# Patient Record
Sex: FEMALE | Race: WHITE | NOT HISPANIC OR LATINO | Employment: FULL TIME | URBAN - METROPOLITAN AREA
[De-identification: names, ages, dates, MRNs, and addresses within clinical notes are randomized per-mention and may not be internally consistent; named-entity substitution may affect disease eponyms.]

---

## 2023-05-30 ENCOUNTER — OFFICE VISIT (OUTPATIENT)
Dept: PRIMARY CARE | Facility: CLINIC | Age: 38
End: 2023-05-30
Payer: COMMERCIAL

## 2023-05-30 VITALS
TEMPERATURE: 97.8 F | RESPIRATION RATE: 16 BRPM | DIASTOLIC BLOOD PRESSURE: 74 MMHG | SYSTOLIC BLOOD PRESSURE: 122 MMHG | HEIGHT: 64 IN | HEART RATE: 72 BPM | WEIGHT: 175 LBS | BODY MASS INDEX: 29.88 KG/M2

## 2023-05-30 DIAGNOSIS — H81.12 BENIGN PAROXYSMAL POSITIONAL VERTIGO OF LEFT EAR: Primary | ICD-10-CM

## 2023-05-30 PROCEDURE — 1036F TOBACCO NON-USER: CPT | Performed by: FAMILY MEDICINE

## 2023-05-30 PROCEDURE — 99213 OFFICE O/P EST LOW 20 MIN: CPT | Performed by: FAMILY MEDICINE

## 2023-05-30 RX ORDER — MECLIZINE HYDROCHLORIDE 25 MG/1
25 TABLET ORAL 3 TIMES DAILY PRN
Qty: 30 TABLET | Refills: 1 | Status: SHIPPED | OUTPATIENT
Start: 2023-05-30

## 2023-05-30 NOTE — PROGRESS NOTES
"Subjective   Patient ID: See Aldana is a 38 y.o. female who presents for Dizziness (Patient has had Vertigo since Sunday ).    Room was spinning and felt nausea   2 meclizine  helped  but today with head movement is making it worse     Had an episodes 6 months ago which went away on its          Review of Systems    Objective   /74   Pulse 72   Temp 36.6 °C (97.8 °F)   Resp 16   Ht 1.626 m (5' 4\")   Wt 79.4 kg (175 lb)   BMI 30.04 kg/m²     Physical Exam  Vitals and nursing note reviewed.   Constitutional:       General: She is not in acute distress.     Appearance: She is not ill-appearing.   HENT:      Head: Normocephalic and atraumatic.      Mouth/Throat:      Mouth: Mucous membranes are moist.   Eyes:      Conjunctiva/sclera: Conjunctivae normal.   Cardiovascular:      Rate and Rhythm: Normal rate and regular rhythm.      Heart sounds: Normal heart sounds.   Pulmonary:      Effort: Pulmonary effort is normal.      Breath sounds: Normal breath sounds.   Skin:     General: Skin is warm.   Neurological:      Mental Status: She is alert.   Psychiatric:         Mood and Affect: Mood normal.         Thought Content: Thought content normal.         Judgment: Judgment normal.         Assessment/Plan   Problem List Items Addressed This Visit    None  Visit Diagnoses       Benign paroxysmal positional vertigo of left ear    -  Primary    Relevant Medications    meclizine (Antivert) 25 mg tablet               "

## 2023-07-12 ENCOUNTER — TELEMEDICINE (OUTPATIENT)
Dept: PRIMARY CARE | Facility: CLINIC | Age: 38
End: 2023-07-12
Payer: COMMERCIAL

## 2023-07-12 DIAGNOSIS — E66.09 CLASS 1 OBESITY DUE TO EXCESS CALORIES WITHOUT SERIOUS COMORBIDITY WITH BODY MASS INDEX (BMI) OF 30.0 TO 30.9 IN ADULT: Primary | ICD-10-CM

## 2023-07-12 PROCEDURE — 99212 OFFICE O/P EST SF 10 MIN: CPT | Performed by: FAMILY MEDICINE

## 2023-07-12 NOTE — PROGRESS NOTES
Subjective   Patient ID: See Aldana is a 38 y.o. female who presents for Obesity.    Last year lost 15 pounds with calorie reduction.  In May returned 2000 calorie a day diet, now back to 1200 calories.   Had fasted 2 weeks of the month for Ramadan   doing 45 minute cardio and doing weights  5 days a week for an hour   No endocrine cancers   will check with her insurance on coverage   Discussed diet and exercise   The visit was done using video and audio. Consent was given for the visit. Patient identity was confirmed. Time spent with patient was 13 minutes which included obtaining history, counselling and coordination of care, ordering and reviewing tests and documentation in medical records.  The patient was located at home          Review of Systems    Objective   There were no vitals taken for this visit.    Physical Exam    Assessment/Plan   Problem List Items Addressed This Visit    None  Visit Diagnoses       Class 1 obesity due to excess calories without serious comorbidity with body mass index (BMI) of 30.0 to 30.9 in adult    -  Primary    Relevant Orders    Referral to Nutrition Services

## 2023-11-30 ENCOUNTER — OFFICE VISIT (OUTPATIENT)
Dept: PRIMARY CARE | Facility: CLINIC | Age: 38
End: 2023-11-30
Payer: COMMERCIAL

## 2023-11-30 VITALS
TEMPERATURE: 96.4 F | WEIGHT: 183 LBS | HEIGHT: 64 IN | HEART RATE: 72 BPM | SYSTOLIC BLOOD PRESSURE: 120 MMHG | DIASTOLIC BLOOD PRESSURE: 78 MMHG | RESPIRATION RATE: 12 BRPM | BODY MASS INDEX: 31.24 KG/M2

## 2023-11-30 DIAGNOSIS — F41.9 ANXIETY: Primary | ICD-10-CM

## 2023-11-30 DIAGNOSIS — R41.840 CONCENTRATION DEFICIT: ICD-10-CM

## 2023-11-30 PROCEDURE — 1036F TOBACCO NON-USER: CPT | Performed by: FAMILY MEDICINE

## 2023-11-30 PROCEDURE — 3008F BODY MASS INDEX DOCD: CPT | Performed by: FAMILY MEDICINE

## 2023-11-30 PROCEDURE — 99213 OFFICE O/P EST LOW 20 MIN: CPT | Performed by: FAMILY MEDICINE

## 2023-11-30 RX ORDER — ESCITALOPRAM OXALATE 5 MG/1
5 TABLET ORAL DAILY
Qty: 30 TABLET | Refills: 1 | Status: SHIPPED | OUTPATIENT
Start: 2023-11-30 | End: 2024-02-28 | Stop reason: ALTCHOICE

## 2023-11-30 ASSESSMENT — ANXIETY QUESTIONNAIRES
2. NOT BEING ABLE TO STOP OR CONTROL WORRYING: NEARLY EVERY DAY
1. FEELING NERVOUS, ANXIOUS, OR ON EDGE: NEARLY EVERY DAY
IF YOU CHECKED OFF ANY PROBLEMS ON THIS QUESTIONNAIRE, HOW DIFFICULT HAVE THESE PROBLEMS MADE IT FOR YOU TO DO YOUR WORK, TAKE CARE OF THINGS AT HOME, OR GET ALONG WITH OTHER PEOPLE: VERY DIFFICULT
7. FEELING AFRAID AS IF SOMETHING AWFUL MIGHT HAPPEN: NEARLY EVERY DAY
5. BEING SO RESTLESS THAT IT IS HARD TO SIT STILL: NEARLY EVERY DAY
GAD7 TOTAL SCORE: 21
6. BECOMING EASILY ANNOYED OR IRRITABLE: NEARLY EVERY DAY
4. TROUBLE RELAXING: NEARLY EVERY DAY
3. WORRYING TOO MUCH ABOUT DIFFERENT THINGS: NEARLY EVERY DAY

## 2023-11-30 ASSESSMENT — PATIENT HEALTH QUESTIONNAIRE - PHQ9
1. LITTLE INTEREST OR PLEASURE IN DOING THINGS: NOT AT ALL
2. FEELING DOWN, DEPRESSED OR HOPELESS: NOT AT ALL
SUM OF ALL RESPONSES TO PHQ9 QUESTIONS 1 & 2: 0

## 2023-11-30 NOTE — PROGRESS NOTES
"Subjective   Patient ID: See Aldana is a 38 y.o. female who presents for Anxiety (Also has trouble focusing).    Working from home past year.  Feels has no control,  hard getting housework done,   ?getting depressed   Takes lots of energy to sit,  procrastinates   Gets distracted easily      Does better with time management if has deadlines but having problem doing it   Had panic attack the other day     Discussed ADHD vs anxiety vs both,  wants to wait on ADHD tests and try medication for anxiety and use time management better which has work in the past          Review of Systems    Objective   /78   Pulse 72   Temp 35.8 °C (96.4 °F) (Temporal)   Resp 12   Ht 1.626 m (5' 4\")   Wt 83 kg (183 lb)   BMI 31.41 kg/m²     Physical Exam  Vitals and nursing note reviewed.   Constitutional:       General: She is not in acute distress.     Appearance: She is not ill-appearing.   HENT:      Head: Normocephalic and atraumatic.      Mouth/Throat:      Mouth: Mucous membranes are moist.   Eyes:      Conjunctiva/sclera: Conjunctivae normal.   Cardiovascular:      Rate and Rhythm: Normal rate and regular rhythm.      Heart sounds: Normal heart sounds.   Pulmonary:      Effort: Pulmonary effort is normal.      Breath sounds: Normal breath sounds.   Skin:     General: Skin is warm.   Neurological:      Mental Status: She is alert.   Psychiatric:         Mood and Affect: Mood normal.         Thought Content: Thought content normal.         Judgment: Judgment normal.         Assessment/Plan   Problem List Items Addressed This Visit    None  Visit Diagnoses         Codes    Anxiety    -  Primary F41.9    Relevant Medications    escitalopram (Lexapro) 5 mg tablet    Concentration deficit     R41.840               "

## 2024-02-07 ENCOUNTER — OFFICE VISIT (OUTPATIENT)
Dept: PRIMARY CARE | Facility: CLINIC | Age: 39
End: 2024-02-07
Payer: COMMERCIAL

## 2024-02-07 VITALS
TEMPERATURE: 96 F | DIASTOLIC BLOOD PRESSURE: 80 MMHG | HEART RATE: 80 BPM | BODY MASS INDEX: 31.24 KG/M2 | WEIGHT: 183 LBS | HEIGHT: 64 IN | RESPIRATION RATE: 16 BRPM | SYSTOLIC BLOOD PRESSURE: 130 MMHG

## 2024-02-07 DIAGNOSIS — M54.50 ACUTE BILATERAL LOW BACK PAIN WITHOUT SCIATICA: Primary | ICD-10-CM

## 2024-02-07 DIAGNOSIS — K52.9 GASTROENTERITIS: ICD-10-CM

## 2024-02-07 LAB
POC APPEARANCE, URINE: CLEAR
POC BILIRUBIN, URINE: NEGATIVE
POC BLOOD, URINE: ABNORMAL
POC COLOR, URINE: YELLOW
POC GLUCOSE, URINE: NEGATIVE MG/DL
POC KETONES, URINE: NEGATIVE MG/DL
POC LEUKOCYTES, URINE: NEGATIVE
POC NITRITE,URINE: NEGATIVE
POC PH, URINE: 5 PH
POC PROTEIN, URINE: ABNORMAL MG/DL
POC SPECIFIC GRAVITY, URINE: 1.02
POC UROBILINOGEN, URINE: 0.2 EU/DL

## 2024-02-07 PROCEDURE — 81002 URINALYSIS NONAUTO W/O SCOPE: CPT | Performed by: FAMILY MEDICINE

## 2024-02-07 PROCEDURE — 3008F BODY MASS INDEX DOCD: CPT | Performed by: FAMILY MEDICINE

## 2024-02-07 PROCEDURE — 99213 OFFICE O/P EST LOW 20 MIN: CPT | Performed by: FAMILY MEDICINE

## 2024-02-07 PROCEDURE — 1036F TOBACCO NON-USER: CPT | Performed by: FAMILY MEDICINE

## 2024-02-07 RX ORDER — ONDANSETRON 4 MG/1
4 TABLET, FILM COATED ORAL EVERY 8 HOURS PRN
Qty: 30 TABLET | Refills: 0 | Status: SHIPPED | OUTPATIENT
Start: 2024-02-07

## 2024-02-07 RX ORDER — OMEPRAZOLE 40 MG/1
40 CAPSULE, DELAYED RELEASE ORAL
Qty: 30 CAPSULE | Refills: 1 | Status: SHIPPED | OUTPATIENT
Start: 2024-02-07 | End: 2024-03-03

## 2024-02-07 ASSESSMENT — ENCOUNTER SYMPTOMS
HEADACHES: 0
WEAKNESS: 0
FEVER: 0
BACK PAIN: 1
DYSURIA: 0
ABDOMINAL PAIN: 0
LEG PAIN: 0
BOWEL INCONTINENCE: 0

## 2024-02-07 ASSESSMENT — PATIENT HEALTH QUESTIONNAIRE - PHQ9
SUM OF ALL RESPONSES TO PHQ9 QUESTIONS 1 & 2: 0
2. FEELING DOWN, DEPRESSED OR HOPELESS: NOT AT ALL
1. LITTLE INTEREST OR PLEASURE IN DOING THINGS: NOT AT ALL

## 2024-02-07 NOTE — PROGRESS NOTES
"Subjective   Patient ID: See Aldana is a 38 y.o. female who presents for Nausea (With lower  back pain  Urgent care  flu test neg  covid test neg x2).    Sickness going through family  son vomited/body aches 2 days   daughter vomited/body aches also few day  another son also got it and all are improving   when stands toes feels numb  no back pain bend    LMP 2 weeks ago normal   vomited few times when first started  Started with headache last Tuesday until yesterday   low back pain started Friday    pain upper back better   Went to UC Saturday and negative for flu    Back hurts all the time but is improving   Taking aleve once a day  was taking ibuprofen all day    Nausea all the time        Back Pain  This is a new problem. The current episode started in the past 7 days. The pain does not radiate. Associated symptoms include pelvic pain. Pertinent negatives include no abdominal pain, bowel incontinence, dysuria, fever, headaches, leg pain or weakness.        Review of Systems   Constitutional:  Negative for fever.   Gastrointestinal:  Negative for abdominal pain and bowel incontinence.   Genitourinary:  Positive for pelvic pain. Negative for dysuria.   Musculoskeletal:  Positive for back pain.   Neurological:  Negative for weakness and headaches.       Objective   /80   Pulse 80   Temp 35.6 °C (96 °F) (Temporal)   Resp 16   Ht 1.626 m (5' 4\")   Wt 83 kg (183 lb)   BMI 31.41 kg/m²     Physical Exam  Vitals and nursing note reviewed.   Constitutional:       General: She is not in acute distress.     Appearance: She is not ill-appearing.   HENT:      Head: Normocephalic and atraumatic.      Mouth/Throat:      Mouth: Mucous membranes are moist.      Pharynx: Oropharynx is clear.   Eyes:      Conjunctiva/sclera: Conjunctivae normal.   Cardiovascular:      Rate and Rhythm: Normal rate and regular rhythm.      Heart sounds: Normal heart sounds.   Pulmonary:      Effort: Pulmonary effort is normal.      " Breath sounds: Normal breath sounds.   Skin:     General: Skin is warm.   Neurological:      Mental Status: She is alert.   Psychiatric:         Mood and Affect: Mood normal.         Thought Content: Thought content normal.         Judgment: Judgment normal.         Assessment/Plan   Problem List Items Addressed This Visit    None  Visit Diagnoses         Codes    Acute bilateral low back pain without sciatica    -  Primary M54.50    Relevant Orders    POCT UA (nonautomated) manually resulted    Gastroenteritis     K52.9    Relevant Medications    omeprazole (PriLOSEC) 40 mg DR capsule    ondansetron (Zofran) 4 mg tablet

## 2024-02-12 ENCOUNTER — PHARMACY VISIT (OUTPATIENT)
Dept: PHARMACY | Facility: CLINIC | Age: 39
End: 2024-02-12

## 2024-02-12 PROCEDURE — RXOTC WILLOW AMBULATORY OTC CHARGE

## 2024-02-28 ENCOUNTER — OFFICE VISIT (OUTPATIENT)
Dept: PRIMARY CARE | Facility: CLINIC | Age: 39
End: 2024-02-28
Payer: COMMERCIAL

## 2024-02-28 ENCOUNTER — HOSPITAL ENCOUNTER (OUTPATIENT)
Dept: RADIOLOGY | Facility: CLINIC | Age: 39
Discharge: HOME | End: 2024-02-28
Payer: COMMERCIAL

## 2024-02-28 ENCOUNTER — PATIENT MESSAGE (OUTPATIENT)
Dept: PRIMARY CARE | Facility: CLINIC | Age: 39
End: 2024-02-28

## 2024-02-28 ENCOUNTER — LAB (OUTPATIENT)
Dept: LAB | Facility: LAB | Age: 39
End: 2024-02-28
Payer: COMMERCIAL

## 2024-02-28 VITALS
SYSTOLIC BLOOD PRESSURE: 102 MMHG | TEMPERATURE: 97.3 F | HEIGHT: 64 IN | RESPIRATION RATE: 20 BRPM | DIASTOLIC BLOOD PRESSURE: 76 MMHG | BODY MASS INDEX: 30.73 KG/M2 | WEIGHT: 180 LBS | HEART RATE: 80 BPM

## 2024-02-28 DIAGNOSIS — R79.89 HIGH SERUM THYROXINE (T4): ICD-10-CM

## 2024-02-28 DIAGNOSIS — G43.909 MIGRAINE WITHOUT STATUS MIGRAINOSUS, NOT INTRACTABLE, UNSPECIFIED MIGRAINE TYPE: ICD-10-CM

## 2024-02-28 DIAGNOSIS — E55.9 VITAMIN D DEFICIENCY: ICD-10-CM

## 2024-02-28 DIAGNOSIS — R79.89 LOW TSH LEVEL: ICD-10-CM

## 2024-02-28 DIAGNOSIS — R53.83 OTHER FATIGUE: ICD-10-CM

## 2024-02-28 DIAGNOSIS — Z82.61: ICD-10-CM

## 2024-02-28 DIAGNOSIS — R06.09 DYSPNEA ON EXERTION: ICD-10-CM

## 2024-02-28 DIAGNOSIS — E66.9 OBESITY (BMI 30.0-34.9): ICD-10-CM

## 2024-02-28 DIAGNOSIS — Z00.00 HEALTHCARE MAINTENANCE: ICD-10-CM

## 2024-02-28 DIAGNOSIS — Z13.31 SCREENING FOR DEPRESSION: ICD-10-CM

## 2024-02-28 DIAGNOSIS — J45.20 MILD INTERMITTENT ASTHMA WITHOUT COMPLICATION (HHS-HCC): ICD-10-CM

## 2024-02-28 DIAGNOSIS — R00.2 PALPITATIONS: Primary | ICD-10-CM

## 2024-02-28 DIAGNOSIS — R74.01 ELEVATED ALT MEASUREMENT: ICD-10-CM

## 2024-02-28 PROBLEM — R51.9 HEADACHE: Status: ACTIVE | Noted: 2024-02-28

## 2024-02-28 PROBLEM — Z20.822 SUSPECTED COVID-19 VIRUS INFECTION: Status: RESOLVED | Noted: 2024-02-28 | Resolved: 2024-02-28

## 2024-02-28 PROBLEM — R41.840 ATTENTION DISTURBANCE: Status: RESOLVED | Noted: 2024-02-28 | Resolved: 2024-02-28

## 2024-02-28 PROBLEM — A04.8 H. PYLORI INFECTION: Status: RESOLVED | Noted: 2024-02-28 | Resolved: 2024-02-28

## 2024-02-28 PROBLEM — Z20.822 SUSPECTED SEVERE ACUTE RESPIRATORY SYNDROME CORONAVIRUS 2 (SARS-COV-2) INFECTION: Status: RESOLVED | Noted: 2024-02-28 | Resolved: 2024-02-28

## 2024-02-28 PROBLEM — B85.0 PEDICULOSIS CAPITIS: Status: RESOLVED | Noted: 2024-02-28 | Resolved: 2024-02-28

## 2024-02-28 PROBLEM — H61.23 BILATERAL IMPACTED CERUMEN: Status: RESOLVED | Noted: 2024-02-28 | Resolved: 2024-02-28

## 2024-02-28 PROBLEM — M25.552 ACUTE PAIN OF LEFT HIP: Status: RESOLVED | Noted: 2024-02-28 | Resolved: 2024-02-28

## 2024-02-28 PROBLEM — B85.0 HEAD LICE: Status: RESOLVED | Noted: 2024-02-28 | Resolved: 2024-02-28

## 2024-02-28 PROBLEM — R71.8 ELEVATED RED BLOOD CELL COUNT: Status: RESOLVED | Noted: 2024-02-28 | Resolved: 2024-02-28

## 2024-02-28 PROBLEM — R07.9 CHEST PAIN: Status: RESOLVED | Noted: 2024-02-28 | Resolved: 2024-02-28

## 2024-02-28 PROBLEM — H60.90 OTITIS EXTERNA: Status: RESOLVED | Noted: 2024-02-28 | Resolved: 2024-02-28

## 2024-02-28 PROBLEM — H92.09 OTALGIA: Status: RESOLVED | Noted: 2024-02-28 | Resolved: 2024-02-28

## 2024-02-28 PROBLEM — J02.9 SORE THROAT: Status: RESOLVED | Noted: 2024-02-28 | Resolved: 2024-02-28

## 2024-02-28 PROBLEM — K52.9 INFLAMMATION OF STOMACH AND INTESTINE: Status: RESOLVED | Noted: 2024-02-28 | Resolved: 2024-02-28

## 2024-02-28 PROBLEM — M25.559 ARTHRALGIA OF HIP: Status: RESOLVED | Noted: 2024-02-28 | Resolved: 2024-02-28

## 2024-02-28 PROBLEM — S43.409A SPRAIN OF SHOULDER: Status: RESOLVED | Noted: 2017-11-13 | Resolved: 2024-02-28

## 2024-02-28 PROBLEM — H61.20 IMPACTED CERUMEN: Status: RESOLVED | Noted: 2023-01-29 | Resolved: 2024-02-28

## 2024-02-28 PROBLEM — J45.909 ASTHMA (HHS-HCC): Status: ACTIVE | Noted: 2024-02-28

## 2024-02-28 PROBLEM — E66.811 OBESITY (BMI 30.0-34.9): Status: ACTIVE | Noted: 2024-02-28

## 2024-02-28 PROBLEM — H61.22 IMPACTED CERUMEN OF LEFT EAR: Status: RESOLVED | Noted: 2024-02-28 | Resolved: 2024-02-28

## 2024-02-28 PROBLEM — J02.9 PHARYNGITIS: Status: RESOLVED | Noted: 2024-02-28 | Resolved: 2024-02-28

## 2024-02-28 PROBLEM — H10.30 ACUTE CONJUNCTIVITIS: Status: RESOLVED | Noted: 2024-02-28 | Resolved: 2024-02-28

## 2024-02-28 PROBLEM — K21.9 GERD (GASTROESOPHAGEAL REFLUX DISEASE): Status: ACTIVE | Noted: 2024-02-28

## 2024-02-28 PROBLEM — F41.9 ANXIETY: Status: ACTIVE | Noted: 2024-02-28

## 2024-02-28 PROBLEM — H81.10 BENIGN PAROXYSMAL POSITIONAL VERTIGO: Status: ACTIVE | Noted: 2024-02-28

## 2024-02-28 PROBLEM — F41.0 PANIC ATTACK: Status: ACTIVE | Noted: 2024-02-28

## 2024-02-28 PROBLEM — L70.0 ACNE CYSTICA: Status: ACTIVE | Noted: 2024-02-28

## 2024-02-28 PROBLEM — M54.16 LUMBAR RADICULOPATHY: Status: ACTIVE | Noted: 2024-02-28

## 2024-02-28 PROBLEM — J06.9 URI (UPPER RESPIRATORY INFECTION): Status: RESOLVED | Noted: 2024-02-28 | Resolved: 2024-02-28

## 2024-02-28 PROBLEM — H92.09 PAIN OF EAR STRUCTURE: Status: RESOLVED | Noted: 2024-02-28 | Resolved: 2024-02-28

## 2024-02-28 PROBLEM — N92.0 MENORRHAGIA: Status: RESOLVED | Noted: 2024-02-28 | Resolved: 2024-02-28

## 2024-02-28 PROBLEM — R50.9 FEVER: Status: RESOLVED | Noted: 2024-02-28 | Resolved: 2024-02-28

## 2024-02-28 PROBLEM — J30.9 ALLERGIC RHINITIS: Status: RESOLVED | Noted: 2024-02-28 | Resolved: 2024-02-28

## 2024-02-28 LAB
25(OH)D3 SERPL-MCNC: <7 NG/ML (ref 30–100)
ALBUMIN SERPL BCP-MCNC: 4.1 G/DL (ref 3.4–5)
ALP SERPL-CCNC: 44 U/L (ref 33–110)
ALT SERPL W P-5'-P-CCNC: 63 U/L (ref 7–45)
ANION GAP SERPL CALC-SCNC: 11 MMOL/L (ref 10–20)
AST SERPL W P-5'-P-CCNC: 34 U/L (ref 9–39)
BILIRUB SERPL-MCNC: 0.6 MG/DL (ref 0–1.2)
BUN SERPL-MCNC: 13 MG/DL (ref 6–23)
CALCIUM SERPL-MCNC: 9.3 MG/DL (ref 8.6–10.3)
CHLORIDE SERPL-SCNC: 103 MMOL/L (ref 98–107)
CHOLEST SERPL-MCNC: 184 MG/DL (ref 0–199)
CHOLESTEROL/HDL RATIO: 3.3
CO2 SERPL-SCNC: 28 MMOL/L (ref 21–32)
CREAT SERPL-MCNC: 0.68 MG/DL (ref 0.5–1.05)
EGFRCR SERPLBLD CKD-EPI 2021: >90 ML/MIN/1.73M*2
ERYTHROCYTE [DISTWIDTH] IN BLOOD BY AUTOMATED COUNT: 13 % (ref 11.5–14.5)
ERYTHROCYTE [SEDIMENTATION RATE] IN BLOOD BY WESTERGREN METHOD: 15 MM/H (ref 0–20)
GLUCOSE SERPL-MCNC: 93 MG/DL (ref 74–99)
HCT VFR BLD AUTO: 37 % (ref 36–46)
HDLC SERPL-MCNC: 56.6 MG/DL
HGB BLD-MCNC: 11.9 G/DL (ref 12–16)
IRON SATN MFR SERPL: 24 % (ref 25–45)
IRON SERPL-MCNC: 86 UG/DL (ref 35–150)
LDLC SERPL CALC-MCNC: 111 MG/DL
MCH RBC QN AUTO: 26.1 PG (ref 26–34)
MCHC RBC AUTO-ENTMCNC: 32.2 G/DL (ref 32–36)
MCV RBC AUTO: 81 FL (ref 80–100)
NON HDL CHOLESTEROL: 127 MG/DL (ref 0–149)
NRBC BLD-RTO: 0 /100 WBCS (ref 0–0)
PLATELET # BLD AUTO: 340 X10*3/UL (ref 150–450)
POTASSIUM SERPL-SCNC: 4.2 MMOL/L (ref 3.5–5.3)
PROT SERPL-MCNC: 6.6 G/DL (ref 6.4–8.2)
RBC # BLD AUTO: 4.56 X10*6/UL (ref 4–5.2)
RHEUMATOID FACT SER NEPH-ACNC: <10 IU/ML (ref 0–15)
SODIUM SERPL-SCNC: 138 MMOL/L (ref 136–145)
T4 FREE SERPL-MCNC: 1.46 NG/DL (ref 0.61–1.12)
TIBC SERPL-MCNC: 356 UG/DL (ref 240–445)
TRIGL SERPL-MCNC: 82 MG/DL (ref 0–149)
TSH SERPL-ACNC: <0.01 MIU/L (ref 0.44–3.98)
UIBC SERPL-MCNC: 270 UG/DL (ref 110–370)
VIT B12 SERPL-MCNC: 474 PG/ML (ref 211–911)
VLDL: 16 MG/DL (ref 0–40)
WBC # BLD AUTO: 6.9 X10*3/UL (ref 4.4–11.3)

## 2024-02-28 PROCEDURE — 96127 BRIEF EMOTIONAL/BEHAV ASSMT: CPT | Performed by: FAMILY MEDICINE

## 2024-02-28 PROCEDURE — 86225 DNA ANTIBODY NATIVE: CPT

## 2024-02-28 PROCEDURE — 83550 IRON BINDING TEST: CPT

## 2024-02-28 PROCEDURE — 83540 ASSAY OF IRON: CPT

## 2024-02-28 PROCEDURE — 71046 X-RAY EXAM CHEST 2 VIEWS: CPT

## 2024-02-28 PROCEDURE — 80053 COMPREHEN METABOLIC PANEL: CPT

## 2024-02-28 PROCEDURE — 85652 RBC SED RATE AUTOMATED: CPT

## 2024-02-28 PROCEDURE — 82306 VITAMIN D 25 HYDROXY: CPT

## 2024-02-28 PROCEDURE — 86235 NUCLEAR ANTIGEN ANTIBODY: CPT

## 2024-02-28 PROCEDURE — 84439 ASSAY OF FREE THYROXINE: CPT

## 2024-02-28 PROCEDURE — 83735 ASSAY OF MAGNESIUM: CPT

## 2024-02-28 PROCEDURE — 84443 ASSAY THYROID STIM HORMONE: CPT

## 2024-02-28 PROCEDURE — 86431 RHEUMATOID FACTOR QUANT: CPT

## 2024-02-28 PROCEDURE — 36415 COLL VENOUS BLD VENIPUNCTURE: CPT

## 2024-02-28 PROCEDURE — 99214 OFFICE O/P EST MOD 30 MIN: CPT | Performed by: FAMILY MEDICINE

## 2024-02-28 PROCEDURE — 71046 X-RAY EXAM CHEST 2 VIEWS: CPT | Performed by: RADIOLOGY

## 2024-02-28 PROCEDURE — 93000 ELECTROCARDIOGRAM COMPLETE: CPT | Performed by: FAMILY MEDICINE

## 2024-02-28 PROCEDURE — 82607 VITAMIN B-12: CPT

## 2024-02-28 PROCEDURE — 85027 COMPLETE CBC AUTOMATED: CPT

## 2024-02-28 PROCEDURE — 80061 LIPID PANEL: CPT

## 2024-02-28 PROCEDURE — 86038 ANTINUCLEAR ANTIBODIES: CPT

## 2024-02-28 RX ORDER — ALBUTEROL SULFATE 90 UG/1
2 AEROSOL, METERED RESPIRATORY (INHALATION) EVERY 4 HOURS PRN
Qty: 8.5 G | Refills: 0 | Status: SHIPPED | OUTPATIENT
Start: 2024-02-28 | End: 2024-05-30

## 2024-02-28 ASSESSMENT — ENCOUNTER SYMPTOMS
BLOOD IN STOOL: 0
FATIGUE: 1
HEMATURIA: 0
DYSPHORIC MOOD: 0
VOMITING: 0
NAUSEA: 0
FEVER: 0
RHINORRHEA: 0
SEIZURES: 0
SORE THROAT: 0
SLEEP DISTURBANCE: 0
BRUISES/BLEEDS EASILY: 0
NERVOUS/ANXIOUS: 0
WHEEZING: 0
COUGH: 0
NUMBNESS: 0
HEADACHES: 1
WEAKNESS: 0
SHORTNESS OF BREATH: 1
PALPITATIONS: 1
CONSTIPATION: 0
DIFFICULTY URINATING: 0
DIARRHEA: 0

## 2024-02-28 ASSESSMENT — PATIENT HEALTH QUESTIONNAIRE - PHQ9
SUM OF ALL RESPONSES TO PHQ9 QUESTIONS 1 AND 2: 0
1. LITTLE INTEREST OR PLEASURE IN DOING THINGS: NOT AT ALL
2. FEELING DOWN, DEPRESSED OR HOPELESS: NOT AT ALL

## 2024-02-28 NOTE — PROGRESS NOTES
"Subjective   Patient ID: Italo Aldana is a 38 y.o. female who presents for Fatigue (Pt was ill last month (tested negative for covid, no resp sx just fever, chills, body aches, headaches) Ever since, pt has been having a hard time catching breath when moving around. She also complains of hard heartbeats randomly, even while sleeping. ).    HPI     Review of Systems   Constitutional:  Positive for fatigue. Negative for fever.        Was ill last month x 2 wk, pt was neg for covid flu  Has palp and fatigue  Has sob, has asthma    HENT:  Negative for congestion, ear pain, rhinorrhea and sore throat.    Respiratory:  Positive for shortness of breath. Negative for cough and wheezing.         Feels asthma is not stable currently  Pt using her sons  alb inhaler   Cardiovascular:  Positive for palpitations. Negative for chest pain.        Dad with cad,   at 63 yo  Brother with early cad,  AAA rupture 41 yo, mom with blockages and is getting stents 71 yo  Pt has never seen card in the past   Gastrointestinal:  Negative for blood in stool, constipation, diarrhea, nausea and vomiting.   Genitourinary:  Negative for difficulty urinating, hematuria, vaginal bleeding and vaginal discharge.   Neurological:  Positive for headaches. Negative for seizures, syncope, weakness and numbness.        Has migraines   Hematological:  Does not bruise/bleed easily.   Psychiatric/Behavioral:  Negative for dysphoric mood, sleep disturbance and suicidal ideas. The patient is not nervous/anxious.        Objective   /76   Pulse 80   Temp 36.3 °C (97.3 °F)   Resp 20   Ht 1.626 m (5' 4\")   Wt 81.6 kg (180 lb)   BMI 30.90 kg/m²     Physical Exam  Vitals and nursing note reviewed.   Constitutional:       General: She is not in acute distress.     Appearance: Normal appearance.   HENT:      Head: Normocephalic and atraumatic.   Cardiovascular:      Rate and Rhythm: Normal rate and regular rhythm.      Heart sounds: Normal heart " sounds.   Pulmonary:      Effort: Pulmonary effort is normal. No respiratory distress.      Breath sounds: Normal breath sounds. No wheezing.   Abdominal:      General: Abdomen is flat. Bowel sounds are normal.      Palpations: Abdomen is soft.   Musculoskeletal:      Cervical back: Neck supple.   Lymphadenopathy:      Cervical: No cervical adenopathy.   Skin:     General: Skin is warm and dry.   Neurological:      General: No focal deficit present.      Mental Status: She is alert.   Psychiatric:         Mood and Affect: Mood normal.         Behavior: Behavior normal.         Assessment/Plan   Problem List Items Addressed This Visit             ICD-10-CM    Asthma J45.909    Relevant Medications    albuterol (ProAir HFA) 90 mcg/actuation inhaler    Other fatigue R53.83    Relevant Orders    Referral to Cardiology    CBC    Comprehensive Metabolic Panel    Lipid Panel    TSH with reflex to Free T4 if abnormal    Vitamin D 25-Hydroxy,Total    Vitamin B12    Iron and TIBC    Screening for depression Z13.31    Obesity (BMI 30.0-34.9) E66.9     Advis healthy diet and exercise  Ho printed         Palpitations - Primary R00.2     Pt with palp, fatigue and dyspnea on exertion and strong FH of early CAD/death  Will check EKG and refer pt to card for card eval         Relevant Orders    ECG 12 Lead    Referral to Cardiology    Dyspnea on exertion R06.09    Relevant Orders    Referral to Cardiology    XR chest 2 views     Other Visit Diagnoses         Codes    Healthcare maintenance     Z00.00    Relevant Orders    Follow Up In [facility] - PCP - Health Maintenance

## 2024-02-28 NOTE — ASSESSMENT & PLAN NOTE
Pt with palp, fatigue and dyspnea on exertion and strong FH of early CAD/death  Will check EKG and refer pt to card for card eval

## 2024-02-28 NOTE — TELEPHONE ENCOUNTER
----- Message from Peg Ely MD sent at 2/28/2024  1:12 PM EST -----  PT WITH LOW VIT D. START VIT D 50,000 international units  WEEKLY X 12 WK. THEN SWITCH TO OTC VIT D 400 international units  DAILY  Tsh is low and t4 high, may be hyperthyroid, see endo   mild elevation in AVA, just recheck lft s in 1 mo

## 2024-02-29 RX ORDER — ERGOCALCIFEROL 1.25 MG/1
50000 CAPSULE ORAL
Qty: 12 CAPSULE | Refills: 0 | Status: SHIPPED | OUTPATIENT
Start: 2024-02-29 | End: 2024-05-23

## 2024-03-01 LAB
ANA PATTERN: ABNORMAL
ANA SER QL HEP2 SUBST: POSITIVE
ANA TITR SER IF: ABNORMAL {TITER}
CENTROMERE B AB SER-ACNC: <0.2 AI
CHROMATIN AB SERPL-ACNC: <0.2 AI
DSDNA AB SER-ACNC: <1 IU/ML
ENA JO1 AB SER QL IA: <0.2 AI
ENA RNP AB SER IA-ACNC: <0.2 AI
ENA SCL70 AB SER QL IA: <0.2 AI
ENA SM AB SER IA-ACNC: <0.2 AI
ENA SM+RNP AB SER QL IA: <0.2 AI
ENA SS-A AB SER IA-ACNC: <0.2 AI
ENA SS-B AB SER IA-ACNC: <0.2 AI
RIBOSOMAL P AB SER-ACNC: <0.2 AI

## 2024-03-03 DIAGNOSIS — K52.9 GASTROENTERITIS: ICD-10-CM

## 2024-03-03 DIAGNOSIS — G43.909 MIGRAINE WITHOUT STATUS MIGRAINOSUS, NOT INTRACTABLE, UNSPECIFIED MIGRAINE TYPE: Primary | ICD-10-CM

## 2024-03-03 LAB — MAGNESIUM SERPL-MCNC: 2 MG/DL (ref 1.6–2.4)

## 2024-03-03 RX ORDER — OMEPRAZOLE 40 MG/1
40 CAPSULE, DELAYED RELEASE ORAL
Qty: 90 CAPSULE | Refills: 1 | Status: SHIPPED | OUTPATIENT
Start: 2024-03-03 | End: 2024-08-30

## 2024-03-04 ENCOUNTER — TELEPHONE (OUTPATIENT)
Dept: PRIMARY CARE | Facility: CLINIC | Age: 39
End: 2024-03-04
Payer: COMMERCIAL

## 2024-03-04 DIAGNOSIS — R76.8 ANA POSITIVE: ICD-10-CM

## 2024-03-04 NOTE — TELEPHONE ENCOUNTER
----- Message from Peg Ely MD sent at 3/4/2024  8:01 AM EST -----  Call pt, kenji positive, see rheum

## 2024-03-12 ENCOUNTER — OFFICE VISIT (OUTPATIENT)
Dept: CARDIOLOGY | Facility: CLINIC | Age: 39
End: 2024-03-12
Payer: COMMERCIAL

## 2024-03-12 VITALS
DIASTOLIC BLOOD PRESSURE: 62 MMHG | SYSTOLIC BLOOD PRESSURE: 102 MMHG | WEIGHT: 181 LBS | HEART RATE: 82 BPM | HEIGHT: 64 IN | BODY MASS INDEX: 30.9 KG/M2

## 2024-03-12 DIAGNOSIS — R00.2 PALPITATIONS: Primary | ICD-10-CM

## 2024-03-12 DIAGNOSIS — Z78.9 NEVER SMOKED CIGARETTES: ICD-10-CM

## 2024-03-12 DIAGNOSIS — R06.09 DYSPNEA ON EXERTION: ICD-10-CM

## 2024-03-12 PROCEDURE — 99204 OFFICE O/P NEW MOD 45 MIN: CPT | Performed by: INTERNAL MEDICINE

## 2024-03-12 PROCEDURE — 1036F TOBACCO NON-USER: CPT | Performed by: INTERNAL MEDICINE

## 2024-03-12 PROCEDURE — 3008F BODY MASS INDEX DOCD: CPT | Performed by: INTERNAL MEDICINE

## 2024-03-12 NOTE — PROGRESS NOTES
CARDIOLOGY OFFICE VISIT      CHIEF COMPLAINT  Chief Complaint   Patient presents with    New Patient Visit     Per PCP due to palps        HISTORY OF PRESENT ILLNESS  See Aldana is a 38 y.o. year old female patient with a history of mild reactive airway disease/asthma who was referred for intermittent palpitations.  She describes this as intermittent episodes of flutter feeling in the chest and sometimes she feels it in the neck and this makes her cough.  She has noticed increased shortness of breath with moderate exertion which is relatively new for her.  She denies any exertional chest pain.  She also denied orthopnea proximal nocturnal dyspnea.  She does have significant family history of premature coronary artery disease, with her father dying at the age of 62 secondary to CAD and another brother with early CAD.  There is apparently a family history of AAA rupture in another brother although this was not definitively confirmed according to the patient.  She denies any prior history of presyncope or syncope.  She does not smoke and she is not exposed to secondhand smoking and she does not drink alcohol.  EKG from 2/28/2024 shows normal sinus rhythm with no acute ST or T wave changes.  She had a recently abnormal thyroid function test for which she has been referred to an endocrinologist.  Labs from February 2024 shows total cholesterol is 184, HDL is 56, LDL is 111 and triglyceride is 82    ASSESSMENT AND PLAN  1.  Palpitations: This appears to be benign PVCs based on her description but we will get a 48-hour Holter monitor for further evaluation to rule out any significant ventricular arrhythmias and also for correlation of symptoms.  Will make further recommendations based on the results.  2.  Dyspnea on exertion this appears to be probably secondary to deconditioning and underlying reactive airway disease, but we will get stress test for further evaluation to rule out any significant coronary artery  disease and make further recommendations based on the results.  3.  Dyslipidemia: With borderline elevated LDL for which diet and exercise is recommended.    Diagnoses and all orders for this visit:  Palpitations  Dyspnea on exertion  BMI 31.0-31.9,adult  Never smoked cigarettes      Recent Cardiovascular Testing:    Echo-  Stress-  Cath-  Carotid Ultrasound-    Past Medical History  Past Medical History:   Diagnosis Date    Acute conjunctivitis 02/28/2024    Allergic rhinitis 02/28/2024    Anxiety     Arthralgia of hip 02/28/2024    Asthma Childhood    Attention disturbance 02/28/2024    Chest pain 02/28/2024    Fatigue 02/28/2024    Fever 02/28/2024    H. pylori infection 02/28/2024    Head lice 02/28/2024    Impacted cerumen 01/29/2023    Inflammation of stomach and intestine 02/28/2024    Menorrhagia 02/28/2024    Otalgia 02/28/2024    Otitis externa 02/28/2024    Pediculosis capitis 02/28/2024    Personal history of other specified conditions 11/29/2021    History of chest pain    Pharyngitis 02/28/2024    Sore throat 02/28/2024    Suspected COVID-19 virus infection 02/28/2024    Suspected severe acute respiratory syndrome coronavirus 2 (SARS-CoV-2) infection 02/28/2024    URI (upper respiratory infection) 02/28/2024       Social History  Social History     Tobacco Use    Smoking status: Never    Smokeless tobacco: Never   Substance Use Topics    Alcohol use: Never    Drug use: Never       Family History     Family History   Problem Relation Name Age of Onset    Kidney disease Mother Gabino     Abnormal EKG Mother Gabino     Diabetes Father Enrique     Heart disease Father Enrique     Asthma Sister Roberto Carlos     Early natural death Brother Gilson     Heart disease Brother Gilson     Vision loss Father's Sister Aunt Hamdia     Arthritis Son Sanju     Asthma Son Reggie         Allergies:  No Known Allergies     Outpatient Medications:  Current Outpatient Medications   Medication Instructions    albuterol (ProAir HFA) 90  "mcg/actuation inhaler 2 puffs, inhalation, Every 4 hours PRN    ergocalciferol (VITAMIN D-2) 50,000 Units, oral, Weekly    meclizine (ANTIVERT) 25 mg, oral, 3 times daily PRN    omeprazole (PRILOSEC) 40 mg, oral, Daily before breakfast, Do not crush or chew.    ondansetron (ZOFRAN) 4 mg, oral, Every 8 hours PRN        Recent Lab Results:    CBC:   Lab Results   Component Value Date    WBC 6.9 02/28/2024    RBC 4.56 02/28/2024    HGB 11.9 (L) 02/28/2024    HCT 37.0 02/28/2024     02/28/2024        CMP:    Lab Results   Component Value Date     02/28/2024    K 4.2 02/28/2024     02/28/2024    CO2 28 02/28/2024    BUN 13 02/28/2024    CREATININE 0.68 02/28/2024    GLUCOSE 93 02/28/2024    CALCIUM 9.3 02/28/2024       Magnesium:    Lab Results   Component Value Date    MG 2.00 02/28/2024       Lipid Profile:    Lab Results   Component Value Date    TRIG 82 02/28/2024    HDL 56.6 02/28/2024    LDLCALC 111 (H) 02/28/2024       TSH:    Lab Results   Component Value Date    TSH <0.01 (L) 02/28/2024       BNP:   No results found for: \"BNP\"     PT/INR:    No results found for: \"PROTIME\", \"INR\"    HgBA1c:    No results found for: \"HGBA1C\"    BMP:  Lab Results   Component Value Date     02/28/2024     01/31/2023     11/29/2021     09/20/2019    K 4.2 02/28/2024    K 4.6 01/31/2023    K 4.5 11/29/2021    K 3.7 09/20/2019     02/28/2024     01/31/2023     11/29/2021     09/20/2019    CO2 28 02/28/2024    CO2 28 01/31/2023    CO2 28 11/29/2021    CO2 27 09/20/2019    BUN 13 02/28/2024    BUN 14 01/31/2023    BUN 12 11/29/2021    BUN 16 09/20/2019    CREATININE 0.68 02/28/2024    CREATININE 0.88 01/31/2023    CREATININE 0.83 11/29/2021    CREATININE 0.83 09/20/2019       CBC:  Lab Results   Component Value Date    WBC 6.9 02/28/2024    WBC 8.5 01/31/2023    WBC 7.3 11/29/2021    WBC 7.7 09/20/2021    RBC 4.56 02/28/2024    RBC 4.88 01/31/2023    RBC 5.07 11/29/2021 " "   RBC 4.80 09/20/2021    HGB 11.9 (L) 02/28/2024    HGB 12.8 01/31/2023    HGB 13.4 11/29/2021    HGB 12.8 09/20/2021    HCT 37.0 02/28/2024    HCT 41.3 01/31/2023    HCT 42.0 11/29/2021    HCT 39.5 09/20/2021    MCV 81 02/28/2024    MCV 85 01/31/2023    MCV 83 11/29/2021    MCV 82 09/20/2021    MCH 26.1 02/28/2024    MCHC 32.2 02/28/2024    MCHC 31.0 (L) 01/31/2023    MCHC 31.9 (L) 11/29/2021    MCHC 32.4 09/20/2021    RDW 13.0 02/28/2024    RDW 14.3 01/31/2023    RDW 14.1 11/29/2021    RDW 13.9 09/20/2021     02/28/2024     01/31/2023     11/29/2021     09/20/2021       Cardiac Enzymes:    No results found for: \"TROPHS\"    Hepatic Function Panel:    Lab Results   Component Value Date    ALKPHOS 44 02/28/2024    ALT 63 (H) 02/28/2024    AST 34 02/28/2024    PROT 6.6 02/28/2024    BILITOT 0.6 02/28/2024         REVIEW OF SYSTEMS  Review of Systems   All other systems reviewed and are negative.      VITALS  Vitals:    03/12/24 1145   BP: 102/62   Pulse: 82     Wt Readings from Last 4 Encounters:   03/12/24 82.1 kg (181 lb)   02/28/24 81.6 kg (180 lb)   02/07/24 83 kg (183 lb)   11/30/23 83 kg (183 lb)       PHYSICAL EXAM  Constitutional:       Appearance: Healthy appearance. Not in distress.   Neck:      Vascular: No JVR. JVD normal.   Pulmonary:      Effort: Pulmonary effort is normal.      Breath sounds: Normal breath sounds. No wheezing. No rhonchi. No rales.   Chest:      Chest wall: Not tender to palpatation.   Cardiovascular:      PMI at left midclavicular line. Normal rate. Regular rhythm. Normal S1. Normal S2.       Murmurs: There is no murmur.      No gallop.  No click. No rub.   Pulses:     Intact distal pulses.   Edema:     Peripheral edema absent.   Abdominal:      General: Bowel sounds are normal.      Palpations: Abdomen is soft.      Tenderness: There is no abdominal tenderness.   Musculoskeletal: Normal range of motion.         General: No tenderness. Skin:     General: " Skin is warm and dry.   Neurological:      General: No focal deficit present.      Mental Status: Alert and oriented to person, place and time.

## 2024-03-15 ASSESSMENT — ENCOUNTER SYMPTOMS
DIZZINESS: 0
SHORTNESS OF BREATH: 0
FEVER: 0
CHILLS: 0
HEADACHES: 0

## 2024-03-15 NOTE — PROGRESS NOTES
"Patient is seen at the request of Peg Ely for my opinion regarding hyperthyroidism. My final recommendations will be communicated back to the requesting provider by way of shared medical record.    Subjective   See Aldana \"Russ" is a 38 y.o. female with a hx of asthma who presents for evaluation of hyperthyroidism.    Labs from 2/28/2024  TSH <0.01  FT4 1.46    TSH was normal prior to this.  Was sick with a fever, chills, and body aches in early February.  PCP checked TFTs which showed hyperthyroidism.    No family hx of thyroid disease that she is aware of.  Son has juvenile rheumatoid arthritis.  Her MICHELLE was recently positive so she is seeing a Rheumatologist for this.    Today:  -still with palpitations and fatigue  -reports heat intolerance    Review of Systems   Constitutional:  Negative for chills and fever.   Respiratory:  Negative for shortness of breath.    Cardiovascular:  Negative for chest pain.   Neurological:  Negative for dizziness and headaches.   Psychiatric/Behavioral:  Negative for behavioral problems.        Past Medical History:   Diagnosis Date    Acute conjunctivitis 02/28/2024    Allergic rhinitis 02/28/2024    Anxiety     Arthralgia of hip 02/28/2024    Asthma Childhood    Attention disturbance 02/28/2024    Chest pain 02/28/2024    Fatigue 02/28/2024    Fever 02/28/2024    H. pylori infection 02/28/2024    Head lice 02/28/2024    Impacted cerumen 01/29/2023    Inflammation of stomach and intestine 02/28/2024    Menorrhagia 02/28/2024    Otalgia 02/28/2024    Otitis externa 02/28/2024    Pediculosis capitis 02/28/2024    Personal history of other specified conditions 11/29/2021    History of chest pain    Pharyngitis 02/28/2024    Sore throat 02/28/2024    Suspected COVID-19 virus infection 02/28/2024    Suspected severe acute respiratory syndrome coronavirus 2 (SARS-CoV-2) infection 02/28/2024    URI (upper respiratory infection) 02/28/2024       Past Surgical History: " "  Procedure Laterality Date    CHOLECYSTECTOMY      WISDOM TOOTH EXTRACTION         Social History     Socioeconomic History    Marital status:      Spouse name: Not on file    Number of children: Not on file    Years of education: Not on file    Highest education level: Not on file   Occupational History    Not on file   Tobacco Use    Smoking status: Never    Smokeless tobacco: Never   Substance and Sexual Activity    Alcohol use: Never    Drug use: Never    Sexual activity: Yes     Partners: Male     Birth control/protection: Condom Male   Other Topics Concern    Not on file   Social History Narrative    Not on file     Social Determinants of Health     Financial Resource Strain: Not on file   Food Insecurity: Not on file   Transportation Needs: Not on file   Physical Activity: Not on file   Stress: Not on file   Social Connections: Not on file   Intimate Partner Violence: Not on file   Housing Stability: Not on file       Objective    Blood pressure 133/80, pulse 67, temperature 35.9 °C (96.7 °F), temperature source Tympanic, resp. rate 20, height 1.626 m (5' 4\"), weight 79.8 kg (176 lb).  Physical Exam  Constitutional:       General: She is not in acute distress.     Appearance: Normal appearance.   Eyes:      Extraocular Movements: Extraocular movements intact.   Neck:      Thyroid: No thyromegaly.      Comments: No palpable nodules. No tenderness with palpation.  Musculoskeletal:      Right lower leg: No edema.      Left lower leg: No edema.   Neurological:      Mental Status: She is alert and oriented to person, place, and time.       Current Outpatient Medications:     albuterol (ProAir HFA) 90 mcg/actuation inhaler, Inhale 2 puffs every 4 hours if needed for wheezing or shortness of breath., Disp: 8.5 g, Rfl: 0    ergocalciferol (Vitamin D-2) 1.25 MG (04832 UT) capsule, Take 1 capsule (50,000 Units) by mouth 1 (one) time per week., Disp: 12 capsule, Rfl: 0    meclizine (Antivert) 25 mg tablet, Take " "1 tablet (25 mg) by mouth 3 times a day as needed for dizziness., Disp: 30 tablet, Rfl: 1    omeprazole (PriLOSEC) 40 mg DR capsule, TAKE 1 CAPSULE (40 MG) BY MOUTH ONCE DAILY IN THE MORNING. TAKE BEFORE MEALS. DO NOT CRUSH OR CHEW., Disp: 90 capsule, Rfl: 1    ondansetron (Zofran) 4 mg tablet, Take 1 tablet (4 mg) by mouth every 8 hours if needed for nausea or vomiting., Disp: 30 tablet, Rfl: 0    Assessment/Plan   See Aldana \"Russ" is a 38 y.o. female with a hx of asthma who presents for evaluation of hyperthyroidism.    Hyperthyroidism    Labs from 2/28/2024  TSH <0.01  FT4 1.46  ESR 15 (N: 0-20)    She had a fever with cold-like symptoms in the beginning of February (chills, back pain, myalgias).  No anterior neck pain.  She had occasional palpitations and some fatigue prior to this cold, but after the cold, both became worse.  Also reports heat intolerance.   Denies diarrhea. No hand tremors.    Subacute thyroiditis is a possibility given preceding cold, but ESR was normal.  Also discussed other possible etiologies of hyperthyroidism: Graves' disease vs toxic MNG vs toxic adenoma.  Discussed treatment options including medication, LOPEZ, surgery.  Discussed potential side effects of methimazole (pruritus, transaminitis, and agranulocytosis).    Will first need to rule out subacute thyroiditis as this does not require any treatment.  Will check antibody for Graves' with labs.  If TRAb negative and she remains hyperthyroid, will obtain thyroid US and NM thyroid uptake scan which we discussed today.    PLAN:  -check TSH, FT4, FT3, TRAb, CBC, CMP,  in 2 weeks    Follow up in 4 months  Uses MyChart.  "

## 2024-03-18 ENCOUNTER — ANCILLARY PROCEDURE (OUTPATIENT)
Dept: CARDIOLOGY | Facility: CLINIC | Age: 39
End: 2024-03-18
Payer: COMMERCIAL

## 2024-03-18 DIAGNOSIS — R00.2 PALPITATIONS: ICD-10-CM

## 2024-03-18 DIAGNOSIS — R06.09 DYSPNEA ON EXERTION: ICD-10-CM

## 2024-03-18 PROCEDURE — 93225 XTRNL ECG REC<48 HRS REC: CPT | Performed by: INTERNAL MEDICINE

## 2024-03-18 PROCEDURE — 93227 XTRNL ECG REC<48 HR R&I: CPT | Performed by: INTERNAL MEDICINE

## 2024-03-20 ENCOUNTER — OFFICE VISIT (OUTPATIENT)
Dept: ENDOCRINOLOGY | Facility: CLINIC | Age: 39
End: 2024-03-20
Payer: COMMERCIAL

## 2024-03-20 VITALS
HEIGHT: 64 IN | RESPIRATION RATE: 20 BRPM | TEMPERATURE: 96.7 F | HEART RATE: 67 BPM | DIASTOLIC BLOOD PRESSURE: 80 MMHG | BODY MASS INDEX: 30.05 KG/M2 | SYSTOLIC BLOOD PRESSURE: 133 MMHG | WEIGHT: 176 LBS

## 2024-03-20 DIAGNOSIS — R79.89 LOW TSH LEVEL: ICD-10-CM

## 2024-03-20 DIAGNOSIS — R79.89 HIGH SERUM THYROXINE (T4): ICD-10-CM

## 2024-03-20 DIAGNOSIS — E05.90 HYPERTHYROIDISM: Primary | ICD-10-CM

## 2024-03-20 PROCEDURE — 99204 OFFICE O/P NEW MOD 45 MIN: CPT | Performed by: STUDENT IN AN ORGANIZED HEALTH CARE EDUCATION/TRAINING PROGRAM

## 2024-03-20 PROCEDURE — 1036F TOBACCO NON-USER: CPT | Performed by: STUDENT IN AN ORGANIZED HEALTH CARE EDUCATION/TRAINING PROGRAM

## 2024-03-20 PROCEDURE — 3008F BODY MASS INDEX DOCD: CPT | Performed by: STUDENT IN AN ORGANIZED HEALTH CARE EDUCATION/TRAINING PROGRAM

## 2024-03-27 ENCOUNTER — OFFICE VISIT (OUTPATIENT)
Dept: RHEUMATOLOGY | Facility: CLINIC | Age: 39
End: 2024-03-27
Payer: COMMERCIAL

## 2024-03-27 VITALS
HEIGHT: 64 IN | BODY MASS INDEX: 30.56 KG/M2 | SYSTOLIC BLOOD PRESSURE: 139 MMHG | WEIGHT: 179 LBS | DIASTOLIC BLOOD PRESSURE: 82 MMHG | TEMPERATURE: 98.2 F | HEART RATE: 72 BPM

## 2024-03-27 DIAGNOSIS — R76.8 ANA POSITIVE: ICD-10-CM

## 2024-03-27 PROCEDURE — 99204 OFFICE O/P NEW MOD 45 MIN: CPT | Performed by: STUDENT IN AN ORGANIZED HEALTH CARE EDUCATION/TRAINING PROGRAM

## 2024-03-27 PROCEDURE — 1036F TOBACCO NON-USER: CPT | Performed by: STUDENT IN AN ORGANIZED HEALTH CARE EDUCATION/TRAINING PROGRAM

## 2024-03-27 PROCEDURE — 3008F BODY MASS INDEX DOCD: CPT | Performed by: STUDENT IN AN ORGANIZED HEALTH CARE EDUCATION/TRAINING PROGRAM

## 2024-03-27 NOTE — PROGRESS NOTES
"Rheumatology New Outpatient Note    Subjective   See Aldana \"Italo\" is a 38 y.o. female presenting today for Abnormal Lab and Fatigue.    History of Presenting Problem:   Italo with PMHx of BPPV, Increased BMI, Vitamin D deficiency, GERD, depression, lumbar radiculopathy, Asthma, and chronic fatigue, is presenting today as a NP for positive MICHELLE    Since 2/2024 she started having significant fatigue. She has morning stiffness all over her body~ 30 minutes. No  joint pain or swelling. She always has myalgias. She denies hair loss, malar rash, photosensitivity, skin rashes, dry eyes, dry mouth, recurrent mouth sores, sudden vision loss, sudden hearing loss, seizures, inflammatory eye disease, h/o blood clots, cold sensitivity in fingers, vasospastic color changes in fingers when exposed to extreme temperatures and muscle weakness.    She has sleeping disturbances and only 4-5 hours. She wakes up unrefreshed. She snores at night.     Her labs from 2/2024 showed elevated T3 and low TSH and pending an endocrinology appointment.     Her son has IRVING      Past Medical History:   Past Medical History:   Diagnosis Date    Acute conjunctivitis 02/28/2024    Allergic rhinitis 02/28/2024    Anxiety     Arthralgia of hip 02/28/2024    Asthma Childhood    Attention disturbance 02/28/2024    Chest pain 02/28/2024    Fatigue 02/28/2024    Fever 02/28/2024    H. pylori infection 02/28/2024    Head lice 02/28/2024    Impacted cerumen 01/29/2023    Inflammation of stomach and intestine 02/28/2024    Menorrhagia 02/28/2024    Otalgia 02/28/2024    Otitis externa 02/28/2024    Pediculosis capitis 02/28/2024    Personal history of other specified conditions 11/29/2021    History of chest pain    Pharyngitis 02/28/2024    Sore throat 02/28/2024    Suspected COVID-19 virus infection 02/28/2024    Suspected severe acute respiratory syndrome coronavirus 2 (SARS-CoV-2) infection 02/28/2024    URI (upper respiratory infection) 02/28/2024 " "      Allergies: No Known Allergies    Medications:   Current Outpatient Medications:     albuterol (ProAir HFA) 90 mcg/actuation inhaler, Inhale 2 puffs every 4 hours if needed for wheezing or shortness of breath., Disp: 8.5 g, Rfl: 0    ergocalciferol (Vitamin D-2) 1.25 MG (05672 UT) capsule, Take 1 capsule (50,000 Units) by mouth 1 (one) time per week., Disp: 12 capsule, Rfl: 0    meclizine (Antivert) 25 mg tablet, Take 1 tablet (25 mg) by mouth 3 times a day as needed for dizziness., Disp: 30 tablet, Rfl: 1    omeprazole (PriLOSEC) 40 mg DR capsule, TAKE 1 CAPSULE (40 MG) BY MOUTH ONCE DAILY IN THE MORNING. TAKE BEFORE MEALS. DO NOT CRUSH OR CHEW., Disp: 90 capsule, Rfl: 1    ondansetron (Zofran) 4 mg tablet, Take 1 tablet (4 mg) by mouth every 8 hours if needed for nausea or vomiting., Disp: 30 tablet, Rfl: 0    Review of Systems:   Constitutional: Denies fever, chills   Eyes: Denies dry eyes, pain in the eyes   ENT: Denies dry mouth, loss of taste, sores in the mouth  Cardiovascular: Denies chest pain, palpitations   Respiratory: Denies shortness of breath   Gastrointestinal: Denies heartburn   Integumentary: Denies photosensitivity, rash or lesions, Raynaud's   Neurological: Denies any numbness or tingling    MSK: As per HPI.     All 10 review of systems have been reviewed and are negative for complaint except as noted in the HPI    Objective   Physical Examination:  Vitals:    03/27/24 1119   BP: 139/82   Pulse: 72   Temp: 36.8 °C (98.2 °F)     Growth %ile SmartLinks can only be used for patients less than 20 years old.  Ht Readings from Last 1 Encounters:   03/27/24 1.613 m (5' 3.5\")     Wt Readings from Last 1 Encounters:   03/27/24 81.2 kg (179 lb)       General - NAD, sitting up in chair, well-groomed, pleasant, AAOx3  Head: Normocephalic, atraumatic  Eyes - PERRLA, EOMI. No conjunctiva injection.   Mouth/ENT - Moist oral and nasal mucosa. No facial rash.   Cardiovascular - Normal S1, S2. Regular rate " "and rhythm. No murmurs or rubs.  Lungs - Symmetric chest expansion. Clear to auscultation bilaterally.   Skin - No rashes or ulcers. Skin warm and dry. No erythema on bilateral cheeks.  Extremities - No edema, cyanosis ,or clubbing  Neurological - Alert and oriented x 3,  grossly intact. No focal deficit.  Musculoskeletal -  Shoulders: Full ROM, without pain, no swelling, warmth or tenderness.  Elbows: Full ROM, without pain, no swelling, warmth or tenderness.  Wrists: Full ROM, without pain, no swelling, warmth or tenderness.  MCP: No swelling, warmth or tenderness. Metacarpal squeeze negative  PIP: No swelling, warmth or tenderness.  DIP: No swelling, warmth or tenderness.  Hands : 5/5.    Sacroiliac joints: No local tenderness. ROC negative.   Hips: Full ROM.  No malalignment.  Knees:  Full ROM, without pain, no swelling, warmth or point tenderness.   Ankles: Full ROM, without pain, swelling, warmth or tenderness.  Toes:  Metatarsal squeeze negative  Cervical spine: No tenderness or limitation of movement  Lumbar spine: No tenderness or limitation of movement        Laboratory Testin2024: CMP normal except ALT 63, TSH <0.01, T3 1.46, CBC with Hb 11.9, MICHELLE 1:640 DFS, MONICA negative        Assessment/Plan   See Aldana \"Italo\" is a 38 y.o. female with PMHx of BPPV, Increased BMI, Vitamin D deficiency, GERD, depression, lumbar radiculopathy, Asthma, and chronic fatigue who is presenting today as a NP for positive MICHELLE.    Positive MICHELLE, DFS pattern which is reassuring. No signs or symptoms of autoimmune connective tissue disease. Suspect hyperthyroidism and potential autoimmune thyroid disease pending endocrinology appointment. Advised patient that positive MICHELLE is not definitively diagnostic of particular condition and must be interpreted according to the clinical context. Explained that positive MICHELLE can be abnormal in subset of people without any associated autoimmune disease. However, advised that " sometimes lab abnormalities can precede onset of clinical symptoms, so advised continuing to monitor for emergence of new symptoms.      The assessment and plan, risk and benefits were discussed with the patient. All of the patients questions were answered and patient agrees to the plan.      Huber Andrade MD  Clinical   Department of Rheumatology   [facility]

## 2024-04-02 ENCOUNTER — APPOINTMENT (OUTPATIENT)
Dept: CARDIOLOGY | Facility: CLINIC | Age: 39
End: 2024-04-02
Payer: COMMERCIAL

## 2024-04-03 ENCOUNTER — HOSPITAL ENCOUNTER (OUTPATIENT)
Dept: CARDIOLOGY | Facility: CLINIC | Age: 39
Discharge: HOME | End: 2024-04-03
Payer: COMMERCIAL

## 2024-04-03 VITALS — BODY MASS INDEX: 30.56 KG/M2 | HEIGHT: 64 IN | WEIGHT: 179 LBS

## 2024-04-03 DIAGNOSIS — R00.2 PALPITATIONS: ICD-10-CM

## 2024-04-03 DIAGNOSIS — R06.02 SHORTNESS OF BREATH: ICD-10-CM

## 2024-04-03 DIAGNOSIS — R06.09 DYSPNEA ON EXERTION: ICD-10-CM

## 2024-04-04 ENCOUNTER — HOSPITAL ENCOUNTER (OUTPATIENT)
Dept: CARDIOLOGY | Facility: CLINIC | Age: 39
Discharge: HOME | End: 2024-04-04
Payer: COMMERCIAL

## 2024-04-04 LAB
AORTIC VALVE MEAN GRADIENT: 6 MMHG
AORTIC VALVE PEAK VELOCITY: 1.65 M/S
AV PEAK GRADIENT: 10.9 MMHG
AVA (PEAK VEL): 2.5 CM2
AVA (VTI): 2.31 CM2
EJECTION FRACTION APICAL 4 CHAMBER: 67.2
LEFT VENTRICLE INTERNAL DIMENSION DIASTOLE: 4.13 CM (ref 3.5–6)
LEFT VENTRICULAR OUTFLOW TRACT DIAMETER: 2.1 CM
MITRAL VALVE E/A RATIO: 0.93
RIGHT VENTRICLE PEAK SYSTOLIC PRESSURE: 26 MMHG

## 2024-04-04 PROCEDURE — 93306 TTE W/DOPPLER COMPLETE: CPT | Performed by: INTERNAL MEDICINE

## 2024-04-04 PROCEDURE — 93306 TTE W/DOPPLER COMPLETE: CPT

## 2024-04-11 ENCOUNTER — TELEPHONE (OUTPATIENT)
Dept: CARDIOLOGY | Facility: CLINIC | Age: 39
End: 2024-04-11
Payer: COMMERCIAL

## 2024-04-11 NOTE — TELEPHONE ENCOUNTER
----- Message from Sam Dickens MD sent at 4/1/2024  2:27 PM EDT -----  Essentially normal Holter monitor with 0.1% PACs which were asymptomatic.

## 2024-04-12 ENCOUNTER — HOSPITAL ENCOUNTER (OUTPATIENT)
Dept: CARDIOLOGY | Facility: CLINIC | Age: 39
Discharge: HOME | End: 2024-04-12
Payer: COMMERCIAL

## 2024-04-12 DIAGNOSIS — R00.2 PALPITATIONS: ICD-10-CM

## 2024-04-12 DIAGNOSIS — R06.09 DYSPNEA ON EXERTION: ICD-10-CM

## 2024-04-12 PROCEDURE — 93016 CV STRESS TEST SUPVJ ONLY: CPT | Performed by: INTERNAL MEDICINE

## 2024-04-12 PROCEDURE — 93018 CV STRESS TEST I&R ONLY: CPT | Performed by: INTERNAL MEDICINE

## 2024-04-12 PROCEDURE — 93017 CV STRESS TEST TRACING ONLY: CPT

## 2024-04-16 ENCOUNTER — APPOINTMENT (OUTPATIENT)
Dept: CARDIOLOGY | Facility: CLINIC | Age: 39
End: 2024-04-16
Payer: COMMERCIAL

## 2024-04-19 ENCOUNTER — TELEPHONE (OUTPATIENT)
Dept: CARDIOLOGY | Facility: CLINIC | Age: 39
End: 2024-04-19

## 2024-04-19 NOTE — TELEPHONE ENCOUNTER
I called patient per Dr Dickens to give results of stress test, echo and Holter monitor. All results were in acceptable limits.

## 2024-05-28 ENCOUNTER — APPOINTMENT (OUTPATIENT)
Dept: PRIMARY CARE | Facility: CLINIC | Age: 39
End: 2024-05-28
Payer: COMMERCIAL

## 2024-05-29 DIAGNOSIS — J45.20 MILD INTERMITTENT ASTHMA WITHOUT COMPLICATION (HHS-HCC): ICD-10-CM

## 2024-05-30 RX ORDER — ALBUTEROL SULFATE 90 UG/1
2 AEROSOL, METERED RESPIRATORY (INHALATION) EVERY 4 HOURS PRN
Qty: 8.5 G | Refills: 0 | Status: SHIPPED | OUTPATIENT
Start: 2024-05-30 | End: 2025-05-30

## 2024-07-03 ENCOUNTER — APPOINTMENT (OUTPATIENT)
Dept: PRIMARY CARE | Facility: CLINIC | Age: 39
End: 2024-07-03
Payer: COMMERCIAL

## 2024-07-08 ENCOUNTER — PHARMACY VISIT (OUTPATIENT)
Dept: PHARMACY | Facility: CLINIC | Age: 39
End: 2024-07-08

## 2024-07-08 PROCEDURE — RXOTC WILLOW AMBULATORY OTC CHARGE

## 2024-08-08 ENCOUNTER — APPOINTMENT (OUTPATIENT)
Dept: ENDOCRINOLOGY | Facility: CLINIC | Age: 39
End: 2024-08-08
Payer: COMMERCIAL

## 2024-09-04 ASSESSMENT — ENCOUNTER SYMPTOMS
FEVER: 0
SHORTNESS OF BREATH: 0
HEADACHES: 0
CHILLS: 0
DIZZINESS: 0

## 2024-09-04 NOTE — PROGRESS NOTES
"FUV for hyperthyroidism. LV with me 03/2024.    Subjective   See Aldana \"Italo\" is a 39 y.o. female with a hx of asthma who presents for follow-upof hyperthyroidism.    Labs from 2/28/2024  TSH <0.01  FT4 1.46    TSH was normal prior to this.  Was sick with a fever, chills, and body aches in early February.  PCP checked TFTs which showed hyperthyroidism.    No family hx of thyroid disease that she is aware of.  Son has juvenile rheumatoid arthritis.  Her MICHLELE was recently positive so she is seeing a Rheumatologist for this.    Social Hx: has 4 children, youngest is 5 years old    Today:  -she did not have labs done after LV    Review of Systems   Constitutional:  Negative for chills and fever.   Respiratory:  Negative for shortness of breath.    Cardiovascular:  Negative for chest pain.   Neurological:  Negative for dizziness and headaches.   Psychiatric/Behavioral:  Negative for behavioral problems.        Past Medical History:   Diagnosis Date    Acute conjunctivitis 02/28/2024    Allergic rhinitis 02/28/2024    Anxiety     Arthralgia of hip 02/28/2024    Asthma ([facility]) Childhood    Attention disturbance 02/28/2024    Chest pain 02/28/2024    Fatigue 02/28/2024    Fever 02/28/2024    H. pylori infection 02/28/2024    Head lice 02/28/2024    Impacted cerumen 01/29/2023    Inflammation of stomach and intestine 02/28/2024    Menorrhagia 02/28/2024    Otalgia 02/28/2024    Otitis externa 02/28/2024    Pediculosis capitis 02/28/2024    Personal history of other specified conditions 11/29/2021    History of chest pain    Pharyngitis 02/28/2024    Sore throat 02/28/2024    Suspected COVID-19 virus infection 02/28/2024    Suspected severe acute respiratory syndrome coronavirus 2 (SARS-CoV-2) infection 02/28/2024    URI (upper respiratory infection) 02/28/2024       Past Surgical History:   Procedure Laterality Date    CHOLECYSTECTOMY      WISDOM TOOTH EXTRACTION         Social History     Socioeconomic History    " Marital status:      Spouse name: Not on file    Number of children: Not on file    Years of education: Not on file    Highest education level: Not on file   Occupational History    Not on file   Tobacco Use    Smoking status: Never    Smokeless tobacco: Never   Substance and Sexual Activity    Alcohol use: Never    Drug use: Never    Sexual activity: Yes     Partners: Male     Birth control/protection: Condom Male   Other Topics Concern    Not on file   Social History Narrative    Not on file     Social Determinants of Health     Financial Resource Strain: Not on file   Food Insecurity: Not on file   Transportation Needs: Not on file   Physical Activity: Not on file   Stress: Not on file   Social Connections: Not on file   Intimate Partner Violence: Not on file   Housing Stability: Not on file       Objective    There were no vitals taken for this visit.  Physical Exam  Constitutional:       General: She is not in acute distress.     Appearance: Normal appearance.   Eyes:      Extraocular Movements: Extraocular movements intact.   Neck:      Thyroid: No thyromegaly.      Comments: No palpable nodules. No tenderness with palpation.  Musculoskeletal:      Right lower leg: No edema.      Left lower leg: No edema.   Neurological:      Mental Status: She is alert and oriented to person, place, and time.       Current Outpatient Medications:     albuterol 90 mcg/actuation inhaler, Inhale 2 puffs every 4 hours if needed for wheezing or shortness of breath., Disp: 8.5 g, Rfl: 0    meclizine (Antivert) 25 mg tablet, Take 1 tablet (25 mg) by mouth 3 times a day as needed for dizziness., Disp: 30 tablet, Rfl: 1    omeprazole (PriLOSEC) 40 mg DR capsule, TAKE 1 CAPSULE (40 MG) BY MOUTH ONCE DAILY IN THE MORNING. TAKE BEFORE MEALS. DO NOT CRUSH OR CHEW., Disp: 90 capsule, Rfl: 1    ondansetron (Zofran) 4 mg tablet, Take 1 tablet (4 mg) by mouth every 8 hours if needed for nausea or vomiting., Disp: 30 tablet, Rfl:  "0    Assessment/Plan   See Aldana \"Italo\" is a 39 y.o. female with a hx of asthma who presents for follow-up of hyperthyroidism.    Hyperthyroidism  Labs from 2/28/2024  TSH <0.01  FT4 1.46  ESR 15 (N: 0-20)    Labs from 9/6/2024  TSH 0.02  FT4 1.96 (N: 0.78-1.48)  FT3 6.5 (N: 2.3-4.2)  TRAb 1.45 (N<1.75)    Not on treatment.  February 2024 was the first documented elevated TFTs, so I recommended repeating the labs at  prior to initiating treatment.  She did not get TFTs done as recommended until last week.    Remains hyperthyroid. TRAb normal.  Will obtain US thyroid and NM uptake scan to determine etiology.  Discussed treatment options of medications vs LOPEZ.  Discussed rare but possible side effects of methimazole: agranulocytosis, transaminitis, and pruritus. Normal ANC and LFTs on 9/6.    Will start methimazole for now.  She will consider LOPEZ. Although she has 4 children, she is able to isolate in her home (she has done this with COVID) and has separate bathrooms that she can use.    Discussed the importance of adherence to methimazole and blood work.    PLAN:  -start methimazole 5 mg QDAY  -obtain US thyroid  -obtain NM thyroid scan (hold methimazole for 5 days prior, resume after scan)  -check TSH, FT4, FT3 in 4 weeks    Follow up in 4 months  Uses MyChart.  "

## 2024-09-06 ENCOUNTER — LAB (OUTPATIENT)
Dept: LAB | Facility: LAB | Age: 39
End: 2024-09-06
Payer: COMMERCIAL

## 2024-09-06 DIAGNOSIS — E05.90 HYPERTHYROIDISM: ICD-10-CM

## 2024-09-06 LAB
ALBUMIN SERPL BCP-MCNC: 4.6 G/DL (ref 3.4–5)
ALP SERPL-CCNC: 57 U/L (ref 33–110)
ALT SERPL W P-5'-P-CCNC: 28 U/L (ref 7–45)
ANION GAP SERPL CALC-SCNC: 13 MMOL/L (ref 10–20)
AST SERPL W P-5'-P-CCNC: 15 U/L (ref 9–39)
BASOPHILS # BLD AUTO: 0.04 X10*3/UL (ref 0–0.1)
BASOPHILS NFR BLD AUTO: 0.5 %
BILIRUB SERPL-MCNC: 0.6 MG/DL (ref 0–1.2)
BUN SERPL-MCNC: 14 MG/DL (ref 6–23)
CALCIUM SERPL-MCNC: 9.8 MG/DL (ref 8.6–10.6)
CHLORIDE SERPL-SCNC: 103 MMOL/L (ref 98–107)
CO2 SERPL-SCNC: 25 MMOL/L (ref 21–32)
CREAT SERPL-MCNC: 0.75 MG/DL (ref 0.5–1.05)
EGFRCR SERPLBLD CKD-EPI 2021: >90 ML/MIN/1.73M*2
EOSINOPHIL # BLD AUTO: 0.09 X10*3/UL (ref 0–0.7)
EOSINOPHIL NFR BLD AUTO: 1.2 %
ERYTHROCYTE [DISTWIDTH] IN BLOOD BY AUTOMATED COUNT: 13.6 % (ref 11.5–14.5)
GLUCOSE SERPL-MCNC: 92 MG/DL (ref 74–99)
HCT VFR BLD AUTO: 42.8 % (ref 36–46)
HGB BLD-MCNC: 13.3 G/DL (ref 12–16)
IMM GRANULOCYTES # BLD AUTO: 0.03 X10*3/UL (ref 0–0.7)
IMM GRANULOCYTES NFR BLD AUTO: 0.4 % (ref 0–0.9)
LYMPHOCYTES # BLD AUTO: 2.86 X10*3/UL (ref 1.2–4.8)
LYMPHOCYTES NFR BLD AUTO: 37.3 %
MCH RBC QN AUTO: 24.3 PG (ref 26–34)
MCHC RBC AUTO-ENTMCNC: 31.1 G/DL (ref 32–36)
MCV RBC AUTO: 78 FL (ref 80–100)
MONOCYTES # BLD AUTO: 0.55 X10*3/UL (ref 0.1–1)
MONOCYTES NFR BLD AUTO: 7.2 %
NEUTROPHILS # BLD AUTO: 4.09 X10*3/UL (ref 1.2–7.7)
NEUTROPHILS NFR BLD AUTO: 53.4 %
NRBC BLD-RTO: 0 /100 WBCS (ref 0–0)
PLATELET # BLD AUTO: 305 X10*3/UL (ref 150–450)
POTASSIUM SERPL-SCNC: 4.2 MMOL/L (ref 3.5–5.3)
PROT SERPL-MCNC: 7.5 G/DL (ref 6.4–8.2)
RBC # BLD AUTO: 5.48 X10*6/UL (ref 4–5.2)
SODIUM SERPL-SCNC: 137 MMOL/L (ref 136–145)
T3FREE SERPL-MCNC: 6.5 PG/ML (ref 2.3–4.2)
T4 FREE SERPL-MCNC: 1.96 NG/DL (ref 0.78–1.48)
TSH SERPL-ACNC: 0.02 MIU/L (ref 0.44–3.98)
WBC # BLD AUTO: 7.7 X10*3/UL (ref 4.4–11.3)

## 2024-09-06 PROCEDURE — 85025 COMPLETE CBC W/AUTO DIFF WBC: CPT

## 2024-09-06 PROCEDURE — 80053 COMPREHEN METABOLIC PANEL: CPT

## 2024-09-06 PROCEDURE — 83519 RIA NONANTIBODY: CPT

## 2024-09-06 PROCEDURE — 84481 FREE ASSAY (FT-3): CPT

## 2024-09-06 PROCEDURE — 84439 ASSAY OF FREE THYROXINE: CPT

## 2024-09-06 PROCEDURE — 36415 COLL VENOUS BLD VENIPUNCTURE: CPT

## 2024-09-06 PROCEDURE — 84443 ASSAY THYROID STIM HORMONE: CPT

## 2024-09-09 ENCOUNTER — OFFICE VISIT (OUTPATIENT)
Dept: ENDOCRINOLOGY | Facility: CLINIC | Age: 39
End: 2024-09-09
Payer: COMMERCIAL

## 2024-09-09 ENCOUNTER — APPOINTMENT (OUTPATIENT)
Dept: ENDOCRINOLOGY | Facility: CLINIC | Age: 39
End: 2024-09-09
Payer: COMMERCIAL

## 2024-09-09 VITALS
WEIGHT: 175 LBS | SYSTOLIC BLOOD PRESSURE: 140 MMHG | HEIGHT: 64 IN | DIASTOLIC BLOOD PRESSURE: 77 MMHG | HEART RATE: 89 BPM | BODY MASS INDEX: 29.88 KG/M2

## 2024-09-09 DIAGNOSIS — E05.90 HYPERTHYROIDISM: Primary | ICD-10-CM

## 2024-09-09 LAB — TSH RECEP AB SER-ACNC: 1.45 IU/L

## 2024-09-09 PROCEDURE — 99215 OFFICE O/P EST HI 40 MIN: CPT | Performed by: STUDENT IN AN ORGANIZED HEALTH CARE EDUCATION/TRAINING PROGRAM

## 2024-09-09 PROCEDURE — 3008F BODY MASS INDEX DOCD: CPT | Performed by: STUDENT IN AN ORGANIZED HEALTH CARE EDUCATION/TRAINING PROGRAM

## 2024-09-09 RX ORDER — METHIMAZOLE 5 MG/1
5 TABLET ORAL DAILY
Qty: 30 TABLET | Refills: 5 | Status: SHIPPED | OUTPATIENT
Start: 2024-09-09 | End: 2025-09-09

## 2024-09-09 ASSESSMENT — PAIN SCALES - GENERAL: PAINLEVEL: 0-NO PAIN

## 2024-09-09 NOTE — PATIENT INSTRUCTIONS
Start methimazole 5 mg, 1 tab, once a day.   Please schedule thyroid ultrasound and thyroid uptake scan   Call: #[phone]    3.   Please HOLD methimazole 5 days prior to the thyroid uptake scan. You should resume the medication after this test is done.    4.  Please have blood work done in 4 weeks (mid October)

## 2024-09-13 ENCOUNTER — HOSPITAL ENCOUNTER (OUTPATIENT)
Dept: RADIOLOGY | Facility: CLINIC | Age: 39
Discharge: HOME | End: 2024-09-13
Payer: COMMERCIAL

## 2024-09-13 DIAGNOSIS — E05.90 HYPERTHYROIDISM: ICD-10-CM

## 2024-09-13 PROCEDURE — 76536 US EXAM OF HEAD AND NECK: CPT

## 2024-09-23 ENCOUNTER — HOSPITAL ENCOUNTER (OUTPATIENT)
Dept: RADIOLOGY | Facility: HOSPITAL | Age: 39
Discharge: HOME | End: 2024-09-23
Payer: COMMERCIAL

## 2024-09-23 DIAGNOSIS — E05.90 HYPERTHYROIDISM: ICD-10-CM

## 2024-09-23 PROCEDURE — A9516 IODINE I-123 SOD IODIDE MIC: HCPCS | Performed by: STUDENT IN AN ORGANIZED HEALTH CARE EDUCATION/TRAINING PROGRAM

## 2024-09-23 PROCEDURE — 78014 THYROID IMAGING W/BLOOD FLOW: CPT

## 2024-09-23 PROCEDURE — 3430000001 HC RX 343 DIAGNOSTIC RADIOPHARMACEUTICALS: Performed by: STUDENT IN AN ORGANIZED HEALTH CARE EDUCATION/TRAINING PROGRAM

## 2024-09-23 RX ORDER — SODIUM IODIDE I 123 200 UCI/1
582 CAPSULE, GELATIN COATED ORAL
Status: COMPLETED | OUTPATIENT
Start: 2024-09-23 | End: 2024-09-23

## 2024-09-24 ENCOUNTER — HOSPITAL ENCOUNTER (OUTPATIENT)
Dept: RADIOLOGY | Facility: HOSPITAL | Age: 39
Discharge: HOME | End: 2024-09-24
Payer: COMMERCIAL

## 2024-09-24 PROCEDURE — 78014 THYROID IMAGING W/BLOOD FLOW: CPT | Performed by: RADIOLOGY

## 2024-10-02 ENCOUNTER — APPOINTMENT (OUTPATIENT)
Dept: PRIMARY CARE | Facility: CLINIC | Age: 39
End: 2024-10-02
Payer: COMMERCIAL

## 2024-10-02 VITALS
HEIGHT: 64 IN | SYSTOLIC BLOOD PRESSURE: 122 MMHG | HEART RATE: 81 BPM | TEMPERATURE: 97.3 F | DIASTOLIC BLOOD PRESSURE: 76 MMHG | WEIGHT: 181 LBS | RESPIRATION RATE: 20 BRPM | BODY MASS INDEX: 30.9 KG/M2

## 2024-10-02 DIAGNOSIS — Z00.00 HEALTHCARE MAINTENANCE: ICD-10-CM

## 2024-10-02 DIAGNOSIS — R76.8 POSITIVE ANA (ANTINUCLEAR ANTIBODY): ICD-10-CM

## 2024-10-02 DIAGNOSIS — R00.2 PALPITATIONS: Primary | ICD-10-CM

## 2024-10-02 DIAGNOSIS — J45.20 MILD INTERMITTENT ASTHMA WITHOUT COMPLICATION (HHS-HCC): ICD-10-CM

## 2024-10-02 DIAGNOSIS — E05.90 HYPERTHYROIDISM: ICD-10-CM

## 2024-10-02 PROCEDURE — 99214 OFFICE O/P EST MOD 30 MIN: CPT | Performed by: FAMILY MEDICINE

## 2024-10-02 ASSESSMENT — ENCOUNTER SYMPTOMS
WHEEZING: 0
COUGH: 0
ABDOMINAL PAIN: 0
DIARRHEA: 0
MYALGIAS: 0
DIFFICULTY URINATING: 0
HEMATURIA: 0
VOMITING: 0
SHORTNESS OF BREATH: 0
WEAKNESS: 0
NAUSEA: 0
ARTHRALGIAS: 0
FEVER: 0
NUMBNESS: 0
FATIGUE: 1
PALPITATIONS: 1

## 2024-10-02 NOTE — PROGRESS NOTES
"Subjective   Patient ID: Italo Aldana is a 39 y.o. female who presents for Follow-up (Pt here to follow up after seeing Endo, Cardio, Rheum for fatigue and palpitations. All visits were normal except Endo dx graves and hyperthyroid with nodule.).    HPI     Review of Systems   Constitutional:  Positive for fatigue. Negative for fever.   Respiratory:  Negative for cough, shortness of breath and wheezing.    Cardiovascular:  Positive for palpitations. Negative for chest pain.        Pt seen endo, Just started methimazole for hyperthyroid, card w/up negative, rheum did not make any further rec.   Gastrointestinal:  Negative for abdominal pain, diarrhea, nausea and vomiting.   Genitourinary:  Negative for difficulty urinating and hematuria.   Musculoskeletal:  Negative for arthralgias and myalgias.   Neurological:  Negative for weakness and numbness.       Objective   /76   Pulse 81   Temp 36.3 °C (97.3 °F)   Resp 20   Ht 1.626 m (5' 4\")   Wt 82.1 kg (181 lb)   BMI 31.07 kg/m²     Physical Exam  Vitals and nursing note reviewed.   Constitutional:       General: She is not in acute distress.     Appearance: Normal appearance.   HENT:      Head: Normocephalic and atraumatic.   Cardiovascular:      Rate and Rhythm: Normal rate and regular rhythm.      Heart sounds: Normal heart sounds.   Pulmonary:      Effort: Pulmonary effort is normal.      Breath sounds: Normal breath sounds.   Skin:     General: Skin is warm and dry.   Neurological:      General: No focal deficit present.      Mental Status: She is alert.         Assessment/Plan   Problem List Items Addressed This Visit             ICD-10-CM    Asthma J45.909     Pt uses rescue inhaler         Palpitations - Primary R00.2     Pt has seen card/[facility]  Has had stress test, echo and holter monitor, all negative           Hyperthyroidism E05.90     Pt has seen endo/ [place]  and has had repeat bw, has ordered us and nm uptake scan  Pt is started on " methimazole and was possibly going for LOPEZ  Has f/up scheduled         Positive MICHELLE (antinuclear antibody) R76.8     Pt has seen rheum/Sabha, at this time does not feel further studies need to be ordered  Rec monitoring of symptoms at this time          Other Visit Diagnoses         Codes    Healthcare maintenance     Z00.00    Relevant Orders    Follow Up In [facility] - PCP - Health Maintenance        All specialists notes/labs/studies  reviewed with pt today

## 2024-10-02 NOTE — ASSESSMENT & PLAN NOTE
Pt has seen endo/ [place]  and has had repeat bw, has ordered us and nm uptake scan  Pt is started on methimazole and was possibly going for LOPEZ  Has f/up scheduled

## 2024-10-02 NOTE — ASSESSMENT & PLAN NOTE
Pt has seen rheum/Sabha, at this time does not feel further studies need to be ordered  Rec monitoring of symptoms at this time

## 2025-01-07 ENCOUNTER — APPOINTMENT (OUTPATIENT)
Dept: CARDIOLOGY | Facility: HOSPITAL | Age: 40
End: 2025-01-07
Payer: COMMERCIAL

## 2025-01-07 ENCOUNTER — APPOINTMENT (OUTPATIENT)
Dept: RADIOLOGY | Facility: HOSPITAL | Age: 40
End: 2025-01-07
Payer: COMMERCIAL

## 2025-01-07 ENCOUNTER — HOSPITAL ENCOUNTER (EMERGENCY)
Facility: HOSPITAL | Age: 40
Discharge: HOME | End: 2025-01-07
Attending: EMERGENCY MEDICINE
Payer: COMMERCIAL

## 2025-01-07 VITALS
TEMPERATURE: 98.1 F | DIASTOLIC BLOOD PRESSURE: 90 MMHG | RESPIRATION RATE: 20 BRPM | OXYGEN SATURATION: 99 % | BODY MASS INDEX: 31.58 KG/M2 | SYSTOLIC BLOOD PRESSURE: 138 MMHG | HEIGHT: 64 IN | WEIGHT: 185 LBS | HEART RATE: 88 BPM

## 2025-01-07 DIAGNOSIS — H53.9 VISUAL DISTURBANCE: Primary | ICD-10-CM

## 2025-01-07 LAB
ALBUMIN SERPL BCP-MCNC: 5 G/DL (ref 3.4–5)
ALP SERPL-CCNC: 64 U/L (ref 33–110)
ALT SERPL W P-5'-P-CCNC: 24 U/L (ref 7–45)
ANION GAP SERPL CALC-SCNC: 14 MMOL/L (ref 10–20)
APPEARANCE UR: CLEAR
AST SERPL W P-5'-P-CCNC: 13 U/L (ref 9–39)
BASOPHILS # BLD AUTO: 0.05 X10*3/UL (ref 0–0.1)
BASOPHILS NFR BLD AUTO: 0.6 %
BILIRUB SERPL-MCNC: 0.8 MG/DL (ref 0–1.2)
BILIRUB UR STRIP.AUTO-MCNC: NEGATIVE MG/DL
BUN SERPL-MCNC: 12 MG/DL (ref 6–23)
CALCIUM SERPL-MCNC: 9.5 MG/DL (ref 8.6–10.3)
CARDIAC TROPONIN I PNL SERPL HS: 3 NG/L (ref 0–13)
CHLORIDE SERPL-SCNC: 102 MMOL/L (ref 98–107)
CO2 SERPL-SCNC: 26 MMOL/L (ref 21–32)
COLOR UR: YELLOW
CREAT SERPL-MCNC: 0.64 MG/DL (ref 0.5–1.05)
EGFRCR SERPLBLD CKD-EPI 2021: >90 ML/MIN/1.73M*2
EOSINOPHIL # BLD AUTO: 0.04 X10*3/UL (ref 0–0.7)
EOSINOPHIL NFR BLD AUTO: 0.5 %
ERYTHROCYTE [DISTWIDTH] IN BLOOD BY AUTOMATED COUNT: 13.4 % (ref 11.5–14.5)
GLUCOSE SERPL-MCNC: 79 MG/DL (ref 74–99)
GLUCOSE UR STRIP.AUTO-MCNC: NORMAL MG/DL
HCG UR QL IA.RAPID: NEGATIVE
HCT VFR BLD AUTO: 44 % (ref 36–46)
HGB BLD-MCNC: 14 G/DL (ref 12–16)
IMM GRANULOCYTES # BLD AUTO: 0.03 X10*3/UL (ref 0–0.7)
IMM GRANULOCYTES NFR BLD AUTO: 0.4 % (ref 0–0.9)
KETONES UR STRIP.AUTO-MCNC: ABNORMAL MG/DL
LEUKOCYTE ESTERASE UR QL STRIP.AUTO: NEGATIVE
LYMPHOCYTES # BLD AUTO: 2.55 X10*3/UL (ref 1.2–4.8)
LYMPHOCYTES NFR BLD AUTO: 30 %
MCH RBC QN AUTO: 24.7 PG (ref 26–34)
MCHC RBC AUTO-ENTMCNC: 31.8 G/DL (ref 32–36)
MCV RBC AUTO: 78 FL (ref 80–100)
MONOCYTES # BLD AUTO: 0.5 X10*3/UL (ref 0.1–1)
MONOCYTES NFR BLD AUTO: 5.9 %
MUCOUS THREADS #/AREA URNS AUTO: NORMAL /LPF
NEUTROPHILS # BLD AUTO: 5.32 X10*3/UL (ref 1.2–7.7)
NEUTROPHILS NFR BLD AUTO: 62.6 %
NITRITE UR QL STRIP.AUTO: NEGATIVE
NRBC BLD-RTO: 0 /100 WBCS (ref 0–0)
PH UR STRIP.AUTO: 5 [PH]
PLATELET # BLD AUTO: 318 X10*3/UL (ref 150–450)
POTASSIUM SERPL-SCNC: 3.4 MMOL/L (ref 3.5–5.3)
PROT SERPL-MCNC: 8.4 G/DL (ref 6.4–8.2)
PROT UR STRIP.AUTO-MCNC: ABNORMAL MG/DL
RBC # BLD AUTO: 5.66 X10*6/UL (ref 4–5.2)
RBC # UR STRIP.AUTO: ABNORMAL /UL
RBC #/AREA URNS AUTO: NORMAL /HPF
SODIUM SERPL-SCNC: 139 MMOL/L (ref 136–145)
SP GR UR STRIP.AUTO: 1.03
UROBILINOGEN UR STRIP.AUTO-MCNC: NORMAL MG/DL
WBC # BLD AUTO: 8.5 X10*3/UL (ref 4.4–11.3)
WBC #/AREA URNS AUTO: NORMAL /HPF

## 2025-01-07 PROCEDURE — 84484 ASSAY OF TROPONIN QUANT: CPT

## 2025-01-07 PROCEDURE — 81025 URINE PREGNANCY TEST: CPT

## 2025-01-07 PROCEDURE — 36415 COLL VENOUS BLD VENIPUNCTURE: CPT

## 2025-01-07 PROCEDURE — 70450 CT HEAD/BRAIN W/O DYE: CPT | Performed by: SURGERY

## 2025-01-07 PROCEDURE — 80053 COMPREHEN METABOLIC PANEL: CPT

## 2025-01-07 PROCEDURE — 2500000005 HC RX 250 GENERAL PHARMACY W/O HCPCS

## 2025-01-07 PROCEDURE — 93005 ELECTROCARDIOGRAM TRACING: CPT

## 2025-01-07 PROCEDURE — 81001 URINALYSIS AUTO W/SCOPE: CPT

## 2025-01-07 PROCEDURE — 70450 CT HEAD/BRAIN W/O DYE: CPT

## 2025-01-07 PROCEDURE — 99285 EMERGENCY DEPT VISIT HI MDM: CPT | Mod: 25 | Performed by: EMERGENCY MEDICINE

## 2025-01-07 PROCEDURE — 85025 COMPLETE CBC W/AUTO DIFF WBC: CPT

## 2025-01-07 RX ORDER — TETRACAINE HYDROCHLORIDE 5 MG/ML
1 SOLUTION OPHTHALMIC ONCE
Status: COMPLETED | OUTPATIENT
Start: 2025-01-07 | End: 2025-01-07

## 2025-01-07 RX ADMIN — FLUORESCEIN SODIUM 1 STRIP: 1 STRIP OPHTHALMIC at 20:18

## 2025-01-07 RX ADMIN — TETRACAINE HYDROCHLORIDE 1 DROP: 5 SOLUTION OPHTHALMIC at 20:19

## 2025-01-07 ASSESSMENT — LIFESTYLE VARIABLES
HAVE PEOPLE ANNOYED YOU BY CRITICIZING YOUR DRINKING: NO
TOTAL SCORE: 0
EVER HAD A DRINK FIRST THING IN THE MORNING TO STEADY YOUR NERVES TO GET RID OF A HANGOVER: NO
EVER FELT BAD OR GUILTY ABOUT YOUR DRINKING: NO
HAVE YOU EVER FELT YOU SHOULD CUT DOWN ON YOUR DRINKING: NO

## 2025-01-07 ASSESSMENT — PAIN DESCRIPTION - DESCRIPTORS: DESCRIPTORS: PRESSURE

## 2025-01-07 ASSESSMENT — PAIN DESCRIPTION - PROGRESSION: CLINICAL_PROGRESSION: NOT CHANGED

## 2025-01-07 ASSESSMENT — COLUMBIA-SUICIDE SEVERITY RATING SCALE - C-SSRS
6. HAVE YOU EVER DONE ANYTHING, STARTED TO DO ANYTHING, OR PREPARED TO DO ANYTHING TO END YOUR LIFE?: NO
2. HAVE YOU ACTUALLY HAD ANY THOUGHTS OF KILLING YOURSELF?: NO
1. IN THE PAST MONTH, HAVE YOU WISHED YOU WERE DEAD OR WISHED YOU COULD GO TO SLEEP AND NOT WAKE UP?: NO

## 2025-01-07 ASSESSMENT — VISUAL ACUITY
OS: 20/20
OU: 20/15
OD: 20/20

## 2025-01-07 ASSESSMENT — PAIN DESCRIPTION - ORIENTATION: ORIENTATION: RIGHT;LEFT

## 2025-01-07 ASSESSMENT — PAIN - FUNCTIONAL ASSESSMENT: PAIN_FUNCTIONAL_ASSESSMENT: 0-10

## 2025-01-07 ASSESSMENT — PAIN SCALES - GENERAL: PAINLEVEL_OUTOF10: 0 - NO PAIN

## 2025-01-07 ASSESSMENT — PAIN DESCRIPTION - LOCATION: LOCATION: EYE

## 2025-01-08 LAB
ATRIAL RATE: 83 BPM
HOLD SPECIMEN: NORMAL
P AXIS: 40 DEGREES
P OFFSET: 193 MS
P ONSET: 143 MS
PR INTERVAL: 150 MS
Q ONSET: 218 MS
QRS COUNT: 14 BEATS
QRS DURATION: 82 MS
QT INTERVAL: 390 MS
QTC CALCULATION(BAZETT): 458 MS
QTC FREDERICIA: 434 MS
R AXIS: 55 DEGREES
T AXIS: 56 DEGREES
T OFFSET: 413 MS
VENTRICULAR RATE: 83 BPM

## 2025-01-08 NOTE — ED PROVIDER NOTES
"HPI   Chief Complaint   Patient presents with    Loss of Vision     Started in the left eye, states it was a perfect [place] of blurry, the went to right eye -> \"went almost completely black and now back to normal.  Patient endorses a \"pressure in both eye balls.\"       Patient is a 39-year-old female with history of hypothyroidism, migraines presenting [facility] ED for visual issues earlier today.  Patient reports around 1230 she began having left-sided blurriness in vision, that then evolved into bilateral lower grayness in vision, that turned into a temporary visual loss for a few seconds.  Patient also reports that she was having little bit lightheadedness/dizziness, when trying to walk afterwards.  That shortly resolved as well.  Patient reports that total duration of symptoms lasted around 10 minutes.  Patient reports that she felt residual left-sided pressure behind her eye.  Patient denies any persistent vision loss.  Patient denies any element of chest pain, shortness of breath, nausea,, diarrhea, abdominal pain, fever, chills, or weakness.  Patient came into ED approximately around 1800, by private vehicle.              Patient History   Past Medical History:   Diagnosis Date    Acute conjunctivitis 02/28/2024    Allergic rhinitis 02/28/2024    Anxiety     Arthralgia of hip 02/28/2024    Attention disturbance 02/28/2024    Chest pain 02/28/2024    Fatigue 02/28/2024    Fever 02/28/2024    H. pylori infection 02/28/2024    Head lice 02/28/2024    Impacted cerumen 01/29/2023    Inflammation of stomach and intestine 02/28/2024    Menorrhagia 02/28/2024    Otalgia 02/28/2024    Otitis externa 02/28/2024    Pediculosis capitis 02/28/2024    Personal history of other specified conditions 11/29/2021    History of chest pain    Pharyngitis 02/28/2024    Sore throat 02/28/2024    Suspected COVID-19 virus infection 02/28/2024    Suspected severe acute respiratory syndrome coronavirus 2 (SARS-CoV-2) infection " 02/28/2024    URI (upper respiratory infection) 02/28/2024     Past Surgical History:   Procedure Laterality Date    CHOLECYSTECTOMY      WISDOM TOOTH EXTRACTION       Family History   Problem Relation Name Age of Onset    Kidney disease Mother Gabino     Abnormal EKG Mother Gabino     Diabetes Father Enrique     Heart disease Father Enrique     Asthma Sister Roberto Carlos     Early natural death Brother Gilson     Heart disease Brother Gilson     Vision loss Father's Sister Aunt Hamdia     Arthritis Son Sanju     Asthma Son Yeeve      Social History     Tobacco Use    Smoking status: Never    Smokeless tobacco: Never   Vaping Use    Vaping status: Never Used   Substance Use Topics    Alcohol use: Never    Drug use: Never       Physical Exam   ED Triage Vitals [01/07/25 1752]   Temperature Heart Rate Respirations BP   36.5 °C (97.7 °F) 94 18 (!) 163/93      Pulse Ox Temp Source Heart Rate Source Patient Position   99 % Temporal Monitor Sitting      BP Location FiO2 (%)     Right arm --       Physical Exam  Constitutional:       Appearance: Normal appearance. She is normal weight.   HENT:      Head: Normocephalic and atraumatic.      Nose: Nose normal.      Mouth/Throat:      Mouth: Mucous membranes are moist.      Pharynx: Oropharynx is clear.   Eyes:      Extraocular Movements: Extraocular movements intact.      Conjunctiva/sclera: Conjunctivae normal.      Pupils: Pupils are equal, round, and reactive to light.      Comments: Tonometry pressures taken of both eyes, 12 bilaterally.  Bedside ultrasound exam was completed of both eyes, negative for vitreal abnormality, retinal detachment, or other abnormality.     Cardiovascular:      Rate and Rhythm: Normal rate and regular rhythm.      Pulses: Normal pulses.      Heart sounds: Normal heart sounds.   Pulmonary:      Effort: Pulmonary effort is normal.      Breath sounds: Normal breath sounds.   Abdominal:      General: Abdomen is flat. Bowel sounds are normal.      Palpations:  "Abdomen is soft.   Musculoskeletal:         General: Normal range of motion.      Cervical back: Normal range of motion and neck supple.   Skin:     General: Skin is warm and dry.      Capillary Refill: Capillary refill takes less than 2 seconds.   Neurological:      General: No focal deficit present.      Mental Status: She is alert and oriented to person, place, and time. Mental status is at baseline.   Psychiatric:         Mood and Affect: Mood normal.         Behavior: Behavior normal.           ED Course & [facility]   ED Course as of 01/07/25 2324 Tue Jan 07, 2025 2051 Tonometry pressures taken of both eyes, 12 bilaterally.  Bedside ultrasound exam was completed of both eyes, negative for vitreal abnormality, retinal detachment, or other abnormality. [MI]      ED Course User Index  [MI] Niya Palomino MD         Diagnoses as of 01/07/25 2324   Visual disturbance                 No data recorded     [place] Coma Scale Score: 15 (01/07/25 1753 : Nona Caceres RN)       NIH Stroke Scale: 0 (01/07/25 1803 : Nona Caceres RN)                   Medical Decision Making  Patient is a 39 y.o. female who presents to [facility] ED for Loss of Vision (Started in the left eye, states it was a perfect [place] of blurry, the went to right eye -> \"went almost completely black and now back to normal.  Patient endorses a \"pressure in both eye balls.\"). On initial ED evaluation, patient found to be in no acute distress. Per HPI, concern to evaluate and treat for differential diagnosis including, but not limited to retinal detachment, CVA.  EKG showed no acute ischemic change.  Troponin negative.  UA showed no acute signs of infection.  Low concern for ACS at this time.  CT head negative for any acute hemorrhage or other abnormality.  CBC, CMP showed no concerning findings of anemia, leukocytosis, electrolyte abnormality.  Patient asymptomatic at this time.  Bedside ultrasound exam was completed of both eyes, showing no vitreal " abnormality, retinal detachment or hemorrhage.  Patient had normal pressures of 12 bilaterally on tonometry.  Patient visual acuity intact.  Patient to be discharged at this time with outpatient PCP follow-up.    Diagnostic findings and treatment plan discussed with patient/family. They are amenable to plan. Rx given for N/A. Patient to follow up with PCP outpatient,referrals provided. Anticipatory guidance and return precautions provided.  Patient otherwise stable for discharge.          Procedure  Procedures     Niya Palomino MD  Resident  01/07/25 7408

## 2025-01-08 NOTE — DISCHARGE INSTRUCTIONS
Seek immediate medical attention if you develop:  Additional vision changes, worsening vision, vision loss, blurred vision, headache, nausea, vomiting, fever, weakness, or any new or worsening symptoms.

## 2025-01-13 ENCOUNTER — APPOINTMENT (OUTPATIENT)
Dept: ENDOCRINOLOGY | Facility: CLINIC | Age: 40
End: 2025-01-13
Payer: COMMERCIAL

## 2025-01-17 ENCOUNTER — APPOINTMENT (OUTPATIENT)
Dept: PRIMARY CARE | Facility: CLINIC | Age: 40
End: 2025-01-17
Payer: COMMERCIAL

## 2025-01-31 ENCOUNTER — APPOINTMENT (OUTPATIENT)
Dept: PRIMARY CARE | Facility: CLINIC | Age: 40
End: 2025-01-31
Payer: COMMERCIAL

## 2025-03-26 ENCOUNTER — OFFICE VISIT (OUTPATIENT)
Dept: URGENT CARE | Age: 40
End: 2025-03-26
Payer: COMMERCIAL

## 2025-03-26 VITALS
WEIGHT: 175 LBS | TEMPERATURE: 97.5 F | OXYGEN SATURATION: 98 % | BODY MASS INDEX: 29.88 KG/M2 | DIASTOLIC BLOOD PRESSURE: 90 MMHG | RESPIRATION RATE: 16 BRPM | SYSTOLIC BLOOD PRESSURE: 144 MMHG | HEART RATE: 83 BPM | HEIGHT: 64 IN

## 2025-03-26 DIAGNOSIS — M54.50 ACUTE MIDLINE LOW BACK PAIN WITHOUT SCIATICA: Primary | ICD-10-CM

## 2025-03-26 LAB
POC APPEARANCE, URINE: CLEAR
POC BILIRUBIN, URINE: NEGATIVE
POC BLOOD, URINE: ABNORMAL
POC COLOR, URINE: YELLOW
POC GLUCOSE, URINE: NEGATIVE MG/DL
POC KETONES, URINE: NEGATIVE MG/DL
POC LEUKOCYTES, URINE: NEGATIVE
POC NITRITE,URINE: NEGATIVE
POC PH, URINE: 6 PH
POC PROTEIN, URINE: NEGATIVE MG/DL
POC SPECIFIC GRAVITY, URINE: >=1.03
POC UROBILINOGEN, URINE: 0.2 EU/DL

## 2025-03-26 PROCEDURE — 96372 THER/PROPH/DIAG INJ SC/IM: CPT

## 2025-03-26 PROCEDURE — 99213 OFFICE O/P EST LOW 20 MIN: CPT

## 2025-03-26 PROCEDURE — 81003 URINALYSIS AUTO W/O SCOPE: CPT

## 2025-03-26 PROCEDURE — 1036F TOBACCO NON-USER: CPT

## 2025-03-26 PROCEDURE — 3008F BODY MASS INDEX DOCD: CPT

## 2025-03-26 RX ORDER — METHOCARBAMOL 500 MG/1
500 TABLET, FILM COATED ORAL EVERY 8 HOURS PRN
Qty: 30 TABLET | Refills: 0 | Status: SHIPPED | OUTPATIENT
Start: 2025-03-26 | End: 2025-04-05

## 2025-03-26 RX ORDER — KETOROLAC TROMETHAMINE 30 MG/ML
30 INJECTION, SOLUTION INTRAMUSCULAR; INTRAVENOUS ONCE
Status: COMPLETED | OUTPATIENT
Start: 2025-03-26 | End: 2025-03-26

## 2025-03-26 RX ADMIN — KETOROLAC TROMETHAMINE 30 MG: 30 INJECTION, SOLUTION INTRAMUSCULAR; INTRAVENOUS at 09:34

## 2025-03-26 ASSESSMENT — ENCOUNTER SYMPTOMS
DYSURIA: 0
WEAKNESS: 0
DIFFICULTY URINATING: 0
NECK PAIN: 0
FREQUENCY: 0
BACK PAIN: 1
WOUND: 0
COLOR CHANGE: 0
NUMBNESS: 0
HEMATURIA: 0

## 2025-03-26 ASSESSMENT — PATIENT HEALTH QUESTIONNAIRE - PHQ9
2. FEELING DOWN, DEPRESSED OR HOPELESS: NOT AT ALL
SUM OF ALL RESPONSES TO PHQ9 QUESTIONS 1 AND 2: 0
1. LITTLE INTEREST OR PLEASURE IN DOING THINGS: NOT AT ALL

## 2025-03-26 NOTE — PROGRESS NOTES
"Subjective   Patient ID: See Aldana \"Italo\" is a 39 y.o. female. They present today with a chief complaint of Back Pain (No injury, 8/10 intermediate pain since last night).    HISTORY OF PRESENT ILLNESS:    Presents to the clinic for low back pain since last night. States it began while she was laying in bed and has remained persistent since then. Reports having a similar type of pain a couple of days ago but it resolved. States she has been lifting/moving/exerting herself more than usual over last several days. Denies direct trauma or injury to the back. Denies urinary symptoms or history of kidney stone. Denies tingling/numbness/weakness of groin or extremities. No prior back injuries or chronic back issues. No hx of GI ulcerations/bleeds or CKD. Last dose of ibuprofen was at 6:00 AM this morning (about 3-4 hours ago).     Past Medical History  Allergies as of 03/26/2025    (No Known Allergies)       Current Outpatient Medications   Medication Instructions    albuterol 90 mcg/actuation inhaler 2 puffs, inhalation, Every 4 hours PRN    meclizine (ANTIVERT) 25 mg, oral, 3 times daily PRN    methIMAzole (TAPAZOLE) 5 mg, oral, Daily    methocarbamol (ROBAXIN) 500 mg, oral, Every 8 hours PRN, May cause drowsiness. Use precautions.    omeprazole (PRILOSEC) 40 mg, oral, Daily before breakfast, Do not crush or chew.    ondansetron (ZOFRAN) 4 mg, oral, Every 8 hours PRN         Past Medical History:   Diagnosis Date    Acute conjunctivitis 02/28/2024    Allergic rhinitis 02/28/2024    Anxiety     Arthralgia of hip 02/28/2024    Attention disturbance 02/28/2024    Chest pain 02/28/2024    Fatigue 02/28/2024    Fever 02/28/2024    H. pylori infection 02/28/2024    Head lice 02/28/2024    Impacted cerumen 01/29/2023    Inflammation of stomach and intestine 02/28/2024    Menorrhagia 02/28/2024    Otalgia 02/28/2024    Otitis externa 02/28/2024    Pediculosis capitis 02/28/2024    Personal history of other specified " "conditions 11/29/2021    History of chest pain    Pharyngitis 02/28/2024    Sore throat 02/28/2024    Suspected COVID-19 virus infection 02/28/2024    Suspected severe acute respiratory syndrome coronavirus 2 (SARS-CoV-2) infection 02/28/2024    URI (upper respiratory infection) 02/28/2024       Past Surgical History:   Procedure Laterality Date    CHOLECYSTECTOMY      WISDOM TOOTH EXTRACTION          reports that she has never smoked. She has never used smokeless tobacco. She reports that she does not drink alcohol and does not use drugs.    Review of Systems    Review of Systems   Genitourinary:  Negative for difficulty urinating, dysuria, frequency, hematuria and urgency.   Musculoskeletal:  Positive for back pain. Negative for neck pain.   Skin:  Negative for color change and wound.   Neurological:  Negative for weakness and numbness.                                 Objective    Vitals:    03/26/25 0852   BP: 144/90   Pulse: 83   Resp: 16   Temp: 36.4 °C (97.5 °F)   SpO2: 98%   Weight: 79.4 kg (175 lb)   Height: 1.626 m (5' 4\")     Patient's last menstrual period was 03/19/2025.  PHYSICAL EXAMINATION:    Physical Exam  Vitals and nursing note reviewed.   Constitutional:       General: She is not in acute distress.     Appearance: Normal appearance. She is not ill-appearing.   HENT:      Head: Normocephalic and atraumatic.      Nose: Nose normal.   Eyes:      General:         Right eye: No discharge.         Left eye: No discharge.      Extraocular Movements: Extraocular movements intact.      Conjunctiva/sclera: Conjunctivae normal.   Cardiovascular:      Rate and Rhythm: Normal rate.   Pulmonary:      Effort: Pulmonary effort is normal. No respiratory distress.   Musculoskeletal:      Cervical back: Normal range of motion and neck supple.      Lumbar back: Tenderness present. No edema, deformity or signs of trauma. Decreased range of motion.        Back:    Skin:     General: Skin is warm and dry. "   Neurological:      General: No focal deficit present.      Mental Status: She is alert and oriented to person, place, and time.      Gait: Gait normal.   Psychiatric:         Mood and Affect: Mood normal.         Behavior: Behavior normal.        Procedures    Results for orders placed or performed in visit on 03/26/25   POCT UA Automated manually resulted   Result Value Ref Range    POC Color, Urine Yellow Straw, Yellow, Light-Yellow    POC Appearance, Urine Clear Clear    POC Glucose, Urine NEGATIVE NEGATIVE mg/dl    POC Bilirubin, Urine NEGATIVE NEGATIVE    POC Ketones, Urine NEGATIVE NEGATIVE mg/dl    POC Specific Gravity, Urine >=1.030 1.005 - 1.035    POC Blood, Urine SMALL (1+) (A) NEGATIVE    POC PH, Urine 6.0 No Reference Range Established PH    POC Protein, Urine NEGATIVE NEGATIVE mg/dl    POC Urobilinogen, Urine 0.2 0.2, 1.0 EU/DL    Poc Nitrite, Urine NEGATIVE NEGATIVE    POC Leukocytes, Urine NEGATIVE NEGATIVE       Diagnostic study results (if any) were reviewed by Ofelia Elliott PA-C.    Assessment/Plan   Allergies, medications, history, and pertinent labs/EKGs/Imaging reviewed by Ofelia Elliott PA-C.     Orders and Diagnoses  Diagnoses and all orders for this visit:  Acute midline low back pain without sciatica  -     ketorolac (Toradol) injection 30 mg  -     POCT UA Automated manually resulted  -     methocarbamol (Robaxin) 500 mg tablet; Take 1 tablet (500 mg) by mouth every 8 hours if needed for muscle spasms for up to 10 days. May cause drowsiness. Use precautions.      Medical Admin Record  Administrations This Visit       ketorolac (Toradol) injection 30 mg       Admin Date  03/26/2025 Action  Given Dose  30 mg Route  intramuscular Documented By  Marissa Randhawa MA                  Given overall well appearance, vital signs, history, and exam as above, there is no indication for further emergent testing/intervention at this time.      I discussed with the patient my clinical thoughts at  this time given the above and we had a shared decision-making conversation in a patient-centered decision-making model on how to proceed forward. Pt was instructed on the importance of close follow-up. They were told that an urgent care diagnosis is often a preliminary impression and that definitive care is often not able to be given in the urgent care setting. Pt was educated that close follow-up is essential for good health and good outcomes. Patient was provided with the following instructions:         No NSAIDs for next 8-12 hours. May take Tylenol right away. After 8-12 hours, recommend ibuprofen or naproxen for pain/inflammation as needed.    May take Robaxin for additional relief as directed/if needed.     Plenty of fluids and rest. Alternate heat and ice, practice gentle stretches as tolerated, avoid heavy lifting.      Follow up with PCP or RTC in the next 7 days if sx fail to show adequate improvement.        Clinical impression as well as limitations of available testing/examination, all discussed with patient. Pt is well at this time in the urgent care. They are comfortable with the present assessment and plan to be discharged home. Discussed with them close outpatient follow up, reassessment, and possible further testing/treatment via their PCP/specialist if symptoms fail to improve; they agree, understand, and are comfortable with this plan. Pt given the opportunity to ask questions prior to discharge and all questions were answered at this time. Via our discussion, the patient was advised of warning signs and instructions were reviewed. Strict ED precautions were given, acknowledged, and understood. Discussed with the patient/family that it is okay to return to the urgent care at any time for anything. Advised to present to the ED if present condition changes/worsens or if they develop new symptoms at any time after discharge. Also, advised to go to the ED if they cannot establish outpatient follow-up  in time frame specified above. Pt verbalized understanding and agreement with plan and instructions. Discussed the need for close follow up with their primary care provider and/or specialist for further testing/treatment/care/consultation in the short time frame as noted above, if needed - they understand these instructions and agree to close follow up for these reasons. Patient discharged home in stable condition.      Follow Up Instructions  No follow-ups on file.    Patient disposition: Home    Electronically signed by Ofelia Elliott PA-C  10:01 AM

## 2025-04-22 ENCOUNTER — APPOINTMENT (OUTPATIENT)
Dept: CARDIOLOGY | Facility: HOSPITAL | Age: 40
End: 2025-04-22
Payer: COMMERCIAL

## 2025-04-22 ENCOUNTER — HOSPITAL ENCOUNTER (EMERGENCY)
Facility: HOSPITAL | Age: 40
Discharge: HOME | End: 2025-04-22
Attending: EMERGENCY MEDICINE
Payer: COMMERCIAL

## 2025-04-22 ENCOUNTER — APPOINTMENT (OUTPATIENT)
Dept: RADIOLOGY | Facility: HOSPITAL | Age: 40
End: 2025-04-22
Payer: COMMERCIAL

## 2025-04-22 VITALS
DIASTOLIC BLOOD PRESSURE: 75 MMHG | HEART RATE: 92 BPM | HEIGHT: 64 IN | RESPIRATION RATE: 22 BRPM | TEMPERATURE: 98.1 F | SYSTOLIC BLOOD PRESSURE: 139 MMHG | BODY MASS INDEX: 32.44 KG/M2 | OXYGEN SATURATION: 97 % | WEIGHT: 190 LBS

## 2025-04-22 DIAGNOSIS — E05.90 HYPERTHYROIDISM: Primary | ICD-10-CM

## 2025-04-22 LAB
ALBUMIN SERPL BCP-MCNC: 4.5 G/DL (ref 3.4–5)
ALP SERPL-CCNC: 58 U/L (ref 33–110)
ALT SERPL W P-5'-P-CCNC: 43 U/L (ref 7–45)
ANION GAP SERPL CALC-SCNC: 16 MMOL/L (ref 10–20)
AST SERPL W P-5'-P-CCNC: 24 U/L (ref 9–39)
B-HCG SERPL-ACNC: <2 MIU/ML
BASOPHILS # BLD AUTO: 0.03 X10*3/UL (ref 0–0.1)
BASOPHILS NFR BLD AUTO: 0.3 %
BILIRUB SERPL-MCNC: 0.7 MG/DL (ref 0–1.2)
BNP SERPL-MCNC: 9 PG/ML (ref 0–99)
BUN SERPL-MCNC: 17 MG/DL (ref 6–23)
CALCIUM SERPL-MCNC: 9.4 MG/DL (ref 8.6–10.3)
CARDIAC TROPONIN I PNL SERPL HS: 10 NG/L (ref 0–13)
CARDIAC TROPONIN I PNL SERPL HS: 12 NG/L (ref 0–13)
CHLORIDE SERPL-SCNC: 104 MMOL/L (ref 98–107)
CO2 SERPL-SCNC: 24 MMOL/L (ref 21–32)
CREAT SERPL-MCNC: 0.58 MG/DL (ref 0.5–1.05)
D DIMER PPP FEU-MCNC: 238 NG/ML FEU
EGFRCR SERPLBLD CKD-EPI 2021: >90 ML/MIN/1.73M*2
EOSINOPHIL # BLD AUTO: 0.02 X10*3/UL (ref 0–0.7)
EOSINOPHIL NFR BLD AUTO: 0.2 %
ERYTHROCYTE [DISTWIDTH] IN BLOOD BY AUTOMATED COUNT: 12.8 % (ref 11.5–14.5)
GLUCOSE SERPL-MCNC: 167 MG/DL (ref 74–99)
HCT VFR BLD AUTO: 41.3 % (ref 36–46)
HGB BLD-MCNC: 13.4 G/DL (ref 12–16)
IMM GRANULOCYTES # BLD AUTO: 0.02 X10*3/UL (ref 0–0.7)
IMM GRANULOCYTES NFR BLD AUTO: 0.2 % (ref 0–0.9)
LYMPHOCYTES # BLD AUTO: 2.36 X10*3/UL (ref 1.2–4.8)
LYMPHOCYTES NFR BLD AUTO: 25.5 %
MAGNESIUM SERPL-MCNC: 2 MG/DL (ref 1.6–2.4)
MCH RBC QN AUTO: 25.2 PG (ref 26–34)
MCHC RBC AUTO-ENTMCNC: 32.4 G/DL (ref 32–36)
MCV RBC AUTO: 78 FL (ref 80–100)
MONOCYTES # BLD AUTO: 0.37 X10*3/UL (ref 0.1–1)
MONOCYTES NFR BLD AUTO: 4 %
NEUTROPHILS # BLD AUTO: 6.47 X10*3/UL (ref 1.2–7.7)
NEUTROPHILS NFR BLD AUTO: 69.8 %
NRBC BLD-RTO: 0 /100 WBCS (ref 0–0)
PLATELET # BLD AUTO: 352 X10*3/UL (ref 150–450)
POTASSIUM SERPL-SCNC: 4 MMOL/L (ref 3.5–5.3)
PROT SERPL-MCNC: 7.1 G/DL (ref 6.4–8.2)
RBC # BLD AUTO: 5.32 X10*6/UL (ref 4–5.2)
SODIUM SERPL-SCNC: 140 MMOL/L (ref 136–145)
T4 FREE SERPL-MCNC: 3.23 NG/DL (ref 0.61–1.12)
TSH SERPL-ACNC: <0.01 MIU/L (ref 0.44–3.98)
WBC # BLD AUTO: 9.3 X10*3/UL (ref 4.4–11.3)

## 2025-04-22 PROCEDURE — 84439 ASSAY OF FREE THYROXINE: CPT | Performed by: PHYSICIAN ASSISTANT

## 2025-04-22 PROCEDURE — 99285 EMERGENCY DEPT VISIT HI MDM: CPT | Performed by: PHYSICIAN ASSISTANT

## 2025-04-22 PROCEDURE — 2500000004 HC RX 250 GENERAL PHARMACY W/ HCPCS (ALT 636 FOR OP/ED): Performed by: EMERGENCY MEDICINE

## 2025-04-22 PROCEDURE — 83880 ASSAY OF NATRIURETIC PEPTIDE: CPT | Performed by: PHYSICIAN ASSISTANT

## 2025-04-22 PROCEDURE — 85379 FIBRIN DEGRADATION QUANT: CPT | Performed by: EMERGENCY MEDICINE

## 2025-04-22 PROCEDURE — 84702 CHORIONIC GONADOTROPIN TEST: CPT | Performed by: EMERGENCY MEDICINE

## 2025-04-22 PROCEDURE — 84443 ASSAY THYROID STIM HORMONE: CPT | Performed by: PHYSICIAN ASSISTANT

## 2025-04-22 PROCEDURE — 99285 EMERGENCY DEPT VISIT HI MDM: CPT | Mod: 25 | Performed by: EMERGENCY MEDICINE

## 2025-04-22 PROCEDURE — 96374 THER/PROPH/DIAG INJ IV PUSH: CPT

## 2025-04-22 PROCEDURE — 84484 ASSAY OF TROPONIN QUANT: CPT | Performed by: EMERGENCY MEDICINE

## 2025-04-22 PROCEDURE — 2500000001 HC RX 250 WO HCPCS SELF ADMINISTERED DRUGS (ALT 637 FOR MEDICARE OP)

## 2025-04-22 PROCEDURE — 96361 HYDRATE IV INFUSION ADD-ON: CPT

## 2025-04-22 PROCEDURE — 83735 ASSAY OF MAGNESIUM: CPT | Performed by: PHYSICIAN ASSISTANT

## 2025-04-22 PROCEDURE — 36415 COLL VENOUS BLD VENIPUNCTURE: CPT | Performed by: EMERGENCY MEDICINE

## 2025-04-22 PROCEDURE — 71045 X-RAY EXAM CHEST 1 VIEW: CPT | Performed by: RADIOLOGY

## 2025-04-22 PROCEDURE — 71045 X-RAY EXAM CHEST 1 VIEW: CPT

## 2025-04-22 PROCEDURE — 85025 COMPLETE CBC W/AUTO DIFF WBC: CPT | Performed by: PHYSICIAN ASSISTANT

## 2025-04-22 PROCEDURE — 93005 ELECTROCARDIOGRAM TRACING: CPT

## 2025-04-22 PROCEDURE — 80053 COMPREHEN METABOLIC PANEL: CPT | Performed by: PHYSICIAN ASSISTANT

## 2025-04-22 PROCEDURE — 36415 COLL VENOUS BLD VENIPUNCTURE: CPT | Performed by: PHYSICIAN ASSISTANT

## 2025-04-22 RX ORDER — LORAZEPAM 2 MG/ML
0.5 INJECTION INTRAMUSCULAR ONCE
Status: COMPLETED | OUTPATIENT
Start: 2025-04-22 | End: 2025-04-22

## 2025-04-22 RX ORDER — ATENOLOL 25 MG/1
25 TABLET ORAL ONCE
Status: COMPLETED | OUTPATIENT
Start: 2025-04-22 | End: 2025-04-22

## 2025-04-22 RX ORDER — ATENOLOL 25 MG/1
25 TABLET ORAL DAILY
Qty: 20 TABLET | Refills: 0 | Status: SHIPPED | OUTPATIENT
Start: 2025-04-22 | End: 2025-05-12

## 2025-04-22 RX ORDER — METHIMAZOLE 5 MG/1
5 TABLET ORAL ONCE
Status: COMPLETED | OUTPATIENT
Start: 2025-04-22 | End: 2025-04-22

## 2025-04-22 RX ADMIN — SODIUM CHLORIDE 1000 ML: 9 INJECTION, SOLUTION INTRAVENOUS at 19:45

## 2025-04-22 RX ADMIN — LORAZEPAM 0.5 MG: 2 INJECTION INTRAMUSCULAR; INTRAVENOUS at 19:45

## 2025-04-22 RX ADMIN — METHIMAZOLE 5 MG: 5 TABLET ORAL at 22:05

## 2025-04-22 RX ADMIN — ATENOLOL 25 MG: 25 TABLET ORAL at 22:05

## 2025-04-22 ASSESSMENT — LIFESTYLE VARIABLES
TOTAL SCORE: 0
EVER HAD A DRINK FIRST THING IN THE MORNING TO STEADY YOUR NERVES TO GET RID OF A HANGOVER: NO
HAVE PEOPLE ANNOYED YOU BY CRITICIZING YOUR DRINKING: NO
EVER FELT BAD OR GUILTY ABOUT YOUR DRINKING: NO
HAVE YOU EVER FELT YOU SHOULD CUT DOWN ON YOUR DRINKING: NO

## 2025-04-22 ASSESSMENT — PAIN SCALES - GENERAL: PAINLEVEL_OUTOF10: 0 - NO PAIN

## 2025-04-22 ASSESSMENT — PAIN - FUNCTIONAL ASSESSMENT: PAIN_FUNCTIONAL_ASSESSMENT: 0-10

## 2025-04-22 NOTE — ED TRIAGE NOTES
Emergency Department Encounter  [facility] EMERGENCY MEDICINE    Patient: See Aldana  MRN: [id number]  : 1985  Date of Evaluation: 2025  Triage Provider: Melanie Torres PA-C      Chief Complaint       Chief Complaint   Patient presents with    Rapid Heart Rate     Pt has hx of hyperthyroid disorder, has not taken her meds in a week, pt c/o sob and dizziness,      [place]    (Location/Symptom, Timing/Onset, Context/Setting, Quality, Duration, Modifying Factors, Severity) Note limiting factors.       See Aldana is a 40 y.o. female who presents to the emergency department complaining of sudden onset heart racing, heart rate is noted to be in the 150s, patient does have a diagnosis of Graves' disease and has not been taking her medications like she is supposed to, patient states she feels really anxious and lightheaded, denies any chest pain but is visibly short of breath.  Denies hemoptysis, leg pain or swelling, history of blood clots, recent surgeries hospitalizations or travel.        Past History   Medical History[1]  Surgical History[2]  Social History[3]    Medications/Allergies     Previous Medications    ALBUTEROL 90 MCG/ACTUATION INHALER    Inhale 2 puffs every 4 hours if needed for wheezing or shortness of breath.    MECLIZINE (ANTIVERT) 25 MG TABLET    Take 1 tablet (25 mg) by mouth 3 times a day as needed for dizziness.    METHIMAZOLE (TAPAZOLE) 5 MG TABLET    Take 1 tablet (5 mg) by mouth once daily.    METHOCARBAMOL (ROBAXIN) 500 MG TABLET    Take 1 tablet (500 mg) by mouth every 8 hours if needed for muscle spasms for up to 10 days. May cause drowsiness. Use precautions.    OMEPRAZOLE (PRILOSEC) 40 MG DR CAPSULE    TAKE 1 CAPSULE (40 MG) BY MOUTH ONCE DAILY IN THE MORNING. TAKE BEFORE MEALS. DO NOT CRUSH OR CHEW.    ONDANSETRON (ZOFRAN) 4 MG TABLET    Take 1 tablet (4 mg) by mouth every 8 hours if needed for nausea or vomiting.     Allergies[4]     Physical Exam        ED Triage Vitals [04/22/25 1900]   Temperature Heart Rate Respirations BP   36.7 °C (98.1 °F) (!) 150 18 (!) 170/99      Pulse Ox Temp Source Heart Rate Source Patient Position   100 % Temporal Monitor Sitting      BP Location FiO2 (%)     Right arm --         Physical Exam    Focused PE    GENERAL:  The patient appears nourished and normally developed. Vital signs as documented.     PULMONARY:  Lungs are clear to auscultation, without any respiratory distress. Able to speak full sentences, no accessory muscle use    CARDIAC:   Tachycardia, regular rhythm, no murmurs, rubs or gallops    ABDOMEN:  Soft, non distended, non tender, BS positive x 4 quadrants, No rebound or guarding, no peritoneal signs, may be limited by patient positioning in triage    MUSCULOSKELETAL:   Able to ambulate, Non edematous, with no obvious deformities. Pulses intact distal    SKIN:   Good color, with no significant rashes.  No pallor.    NEURO:  Alert and oriented, speech clear and coherent    Plan     EKG showed sinus tachycardia, basic labs were obtained as well, patient brought back from triage for further evaluation and telemetry.      For the remainder of the patient's workup and ED course, please see the main ED provider note.  We discussed need for diagnostic testing including lab studies and imaging.  We also discussed that they may be asked to wait in the waiting room while these test are pending.  They understand that if they choose to leave without having the testing completed or resulted that we cannot rule out acute life-threatening illnesses and the risks involved to lead to worsening condition, permanent disability or even death.        Comment: Please note this report has been produced using speech recognition software and may contain errors related to that system including errors in grammar, punctuation, and spelling, as well as words and phrases that may be inappropriate.  If there are any questions or concerns please  feel free to contact the dictating provider for clarification.    Melanie Torres PA-C         [1]   Past Medical History:  Diagnosis Date    Acute conjunctivitis 02/28/2024    Allergic rhinitis 02/28/2024    Anxiety     Arthralgia of hip 02/28/2024    Attention disturbance 02/28/2024    Chest pain 02/28/2024    Fatigue 02/28/2024    Fever 02/28/2024    H. pylori infection 02/28/2024    Head lice 02/28/2024    Impacted cerumen 01/29/2023    Inflammation of stomach and intestine 02/28/2024    Menorrhagia 02/28/2024    Otalgia 02/28/2024    Otitis externa 02/28/2024    Pediculosis capitis 02/28/2024    Personal history of other specified conditions 11/29/2021    History of chest pain    Pharyngitis 02/28/2024    Sore throat 02/28/2024    Suspected COVID-19 virus infection 02/28/2024    Suspected severe acute respiratory syndrome coronavirus 2 (SARS-CoV-2) infection 02/28/2024    URI (upper respiratory infection) 02/28/2024   [2]   Past Surgical History:  Procedure Laterality Date    CHOLECYSTECTOMY      WISDOM TOOTH EXTRACTION     [3]   Social History  Socioeconomic History    Marital status:    Tobacco Use    Smoking status: Never    Smokeless tobacco: Never   Vaping Use    Vaping status: Never Used   Substance and Sexual Activity    Alcohol use: Never    Drug use: Never    Sexual activity: Yes     Partners: Male     Birth control/protection: Condom Male   [4] No Known Allergies

## 2025-04-22 NOTE — ED PROVIDER NOTES
Emergency Department Provider Note        History of Present Illness     History provided by: Patient  Limitations to History: None  External Records Reviewed with Brief Summary: Recent ED provider note from 1/7/2025    HPI:  See Aldana is a 40 y.o. female with past medical history significant for hyperthyroidism secondary to Graves' disease, migraines presenting to the emergency department due to rapid heart rate.  Patient was at a family event roughly 1 hour prior to presenting and noted she had significant amount of heart palpitation where she felt like her heart was pounding out of her chest.  She does have Graves' disease and hyperthyroidism and is prescribed methimazole.  Patient has not been taking this medication as unable to articulate why.  She otherwise did note she had some left leg pain and swelling over the last several days.  She is otherwise denying any shortness of breath, abdominal pain, headache, urinary symptoms.    Physical Exam   Triage vitals:  T 36.7 °C (98.1 °F)  HR (!) 150  BP (!) 170/99  RR 18  O2 100 % None (Room air)    Physical Exam  Constitutional:       General: She is not in acute distress.     Appearance: Normal appearance.   HENT:      Head: Normocephalic.      Nose: Nose normal.      Mouth/Throat:      Mouth: Mucous membranes are moist.   Eyes:      Extraocular Movements: Extraocular movements intact.      Conjunctiva/sclera: Conjunctivae normal.      Pupils: Pupils are equal, round, and reactive to light.   Cardiovascular:      Rate and Rhythm: Regular rhythm. Tachycardia present.      Pulses: Normal pulses.      Heart sounds: Normal heart sounds. No murmur heard.     No gallop.   Pulmonary:      Effort: Pulmonary effort is normal. No respiratory distress.      Breath sounds: Normal breath sounds. No wheezing, rhonchi or rales.   Abdominal:      General: Abdomen is flat. Bowel sounds are normal.      Palpations: Abdomen is soft.      Tenderness: There is no abdominal  tenderness.   Musculoskeletal:         General: Normal range of motion.      Cervical back: Normal range of motion. No rigidity.      Comments: Mildly swollen right lower extremity with minimal tenderness and no skin changes   Skin:     General: Skin is warm.      Capillary Refill: Capillary refill takes less than 2 seconds.   Neurological:      General: No focal deficit present.      Mental Status: She is alert.          Medical Decision Making & ED Course   Medical Decision Makin y.o. female past medical history and history of present illness as described above presenting due to heart palpitations and was found to be tachycardic to 150 on initial evaluation.  She does have a history of hyperthyroidism secondary to Graves' disease which she has prior to methimazole but has not been taking it.  Laboratory workup to include serial troponins, TSH with reflex to free T4, D-dimer, CMP, CBC, mag, pregnancy test as well as a chest x-ray.  Workup notable for a TSH that is less than 0.01 and an elevated free T4-3 0.23.  Otherwise workup largely however chest x-ray did show remote granulomatous disease.  D-dimer was negative, no indication to pursue ultrasound leg or CT PE at this time given other diagnosis much more likely.  Will treat tachycardic with atenolol, methimazole as well as a liter of fluids and 0.5 Ativan as patient was significantly anxious on initial arrival.  Ativan and a liter bolus was started prior to further interventions and did note that heart rate went from 150s to 120s following this.  After full workup was complete methimazole and atenolol were given and patient subsequently went with heart rate into the 80s.  At that time patient was feeling significantly better and was discharged in stable condition.  Patient sent with atenolol to the home with patient states that she has benzimidazole at home that she will begin to take.  Stressed the importance of taking her medications for her  hypothyroidism.  Patient states she will follow-up with her primary care provider.  ----    Differential diagnoses considered include but are not limited to: Thyroid storm, hyperthyroidism, pulmonary embolism, ACS, A-fib, heart failure, pneumonia, sepsis     Social Determinants of Health which Significantly Impact Care: None identified The following actions were taken to address these social determinants: n/a    EKG Independent Interpretation: EKG interpreted by myself. Please see ED Course for full interpretation.    Independent Result Review and Interpretation: Relevant laboratory and radiographic results were reviewed and independently interpreted by myself.  As necessary, they are commented on in the ED Course.    Chronic conditions affecting the patient's care: As documented above in [facility]    The patient was discussed with the following consultants/services: None    Care Considerations: As documented above in [facility]    ED Course:  ED Course as of 04/23/25 0151 Tue Apr 22, 2025 1914 EKG: Sinus rhythm at 151 bpm.  .  QRS 68.  QTc 523.  Normal axis.  No ST elevations, no acute ischemic pattern [PS]   1936 EKG on my interpretation showing sinus rhythm with ventricular rate approximately 150 bpm, intervals otherwise appropriate, no significant ST segment elevation. [IS]   2008 XR chest 1 view  Remote granulomatous disease. No evidence of acute intrathoracic  abnormality.   [IS]   2033 Thyroxine, Free [IS]      ED Course User Index  [IS] Markie Sherwood MD  [PS] Kenny Diana DO         Diagnoses as of 04/23/25 0151   Hyperthyroidism     Disposition   Discharge    Procedures   Procedures    Patient seen and discussed with ED attending physician.    Markie Sherwood MD  Emergency Medicine     Markie Sherwood MD  Resident  04/23/25 0151

## 2025-04-23 ENCOUNTER — OFFICE VISIT (OUTPATIENT)
Dept: PRIMARY CARE | Facility: CLINIC | Age: 40
End: 2025-04-23
Payer: COMMERCIAL

## 2025-04-23 VITALS
SYSTOLIC BLOOD PRESSURE: 112 MMHG | HEIGHT: 64 IN | WEIGHT: 187 LBS | HEART RATE: 84 BPM | TEMPERATURE: 97 F | DIASTOLIC BLOOD PRESSURE: 78 MMHG | BODY MASS INDEX: 31.92 KG/M2 | RESPIRATION RATE: 20 BRPM

## 2025-04-23 DIAGNOSIS — E66.09 CLASS 1 OBESITY DUE TO EXCESS CALORIES WITHOUT SERIOUS COMORBIDITY WITH BODY MASS INDEX (BMI) OF 32.0 TO 32.9 IN ADULT: ICD-10-CM

## 2025-04-23 DIAGNOSIS — E66.811 CLASS 1 OBESITY DUE TO EXCESS CALORIES WITHOUT SERIOUS COMORBIDITY WITH BODY MASS INDEX (BMI) OF 32.0 TO 32.9 IN ADULT: ICD-10-CM

## 2025-04-23 DIAGNOSIS — E05.90 HYPERTHYROIDISM: ICD-10-CM

## 2025-04-23 DIAGNOSIS — J45.20 MILD INTERMITTENT ASTHMA WITHOUT COMPLICATION (HHS-HCC): ICD-10-CM

## 2025-04-23 DIAGNOSIS — Z09 ENCOUNTER FOR FOLLOW-UP: Primary | ICD-10-CM

## 2025-04-23 LAB
ATRIAL RATE: 151 BPM
P AXIS: 83 DEGREES
P OFFSET: 211 MS
P ONSET: 156 MS
PR INTERVAL: 124 MS
Q ONSET: 218 MS
QRS COUNT: 25 BEATS
QRS DURATION: 68 MS
QT INTERVAL: 330 MS
QTC CALCULATION(BAZETT): 523 MS
QTC FREDERICIA: 448 MS
R AXIS: 53 DEGREES
T AXIS: 77 DEGREES
T OFFSET: 383 MS
VENTRICULAR RATE: 151 BPM

## 2025-04-23 PROCEDURE — 99214 OFFICE O/P EST MOD 30 MIN: CPT | Performed by: FAMILY MEDICINE

## 2025-04-23 ASSESSMENT — ENCOUNTER SYMPTOMS
FATIGUE: 0
WHEEZING: 0
DIFFICULTY URINATING: 0
DIARRHEA: 0
CONSTIPATION: 0
VOMITING: 0
SHORTNESS OF BREATH: 0
PALPITATIONS: 1
NAUSEA: 0

## 2025-04-23 NOTE — DISCHARGE INSTRUCTIONS
Please take atenolol daily as well as your home methimazole.  Follow-up with your primary care provider.    Please return to the ER or seek immediate medical attention if you experience new or worsening chest pain, shortness of breath, fever of 38C (100.4) or higher, persistent vomiting, weakness, numbness, tingling, excessive sweating,  loss of motion in your arms or legs, fainting, vision changes, or any new or worsening symptoms.    You are welcome back any time. Thank you for entrusting your care to us, I hope we made your visit as pleasant as possible. Wishing you well!    Dr. Sherwood

## 2025-04-23 NOTE — ASSESSMENT & PLAN NOTE
Pt seen art [facility] ER 4/22/25 for increased HR/palpitations  Cxr showed remote granulomatous dz, d dimer neg, EKG sinus tach, tsh low, t4 high cbc and cmp ok  Dx hyperthyroid  Pt had  stopped her methimazole  Pt given ivf atenolol and methimazole in ER with improvement  Pt needs to f/up with endo  Referral made today

## 2025-04-23 NOTE — PROGRESS NOTES
"Subjective   Patient ID: Italo Aldana is a 40 y.o. female who presents for Follow-up ([facility] ER follow up. Went in yesterday due to heart racing and lightheadedness. EKG, chest x-ray, and BW was done. Pt was given Atenolol to slow HR and lower BP. Was told to follow up with her Endo. ).    HPI     Review of Systems   Constitutional:  Negative for fatigue.   Respiratory:  Negative for shortness of breath and wheezing.    Cardiovascular:  Positive for palpitations. Negative for chest pain.   Gastrointestinal:  Negative for constipation, diarrhea, nausea and vomiting.   Endocrine:        Hyperthyroid  Pt admits to being non compliant with medication  Pt has f/up with endo  Cont atenolol and methimazole until seen by endo 5/5/25   Genitourinary:  Negative for difficulty urinating.       Objective   /78   Pulse 84   Temp 36.1 °C (97 °F)   Resp 20   Ht 1.626 m (5' 4\")   Wt 84.8 kg (187 lb)   LMP 03/19/2025   BMI 32.10 kg/m²     Physical Exam  Vitals and nursing note reviewed.   Constitutional:       General: She is not in acute distress.     Appearance: Normal appearance.   HENT:      Head: Normocephalic and atraumatic.   Cardiovascular:      Rate and Rhythm: Normal rate and regular rhythm.      Heart sounds: Normal heart sounds.   Pulmonary:      Effort: Pulmonary effort is normal.      Breath sounds: Normal breath sounds.   Skin:     General: Skin is warm and dry.   Neurological:      Mental Status: She is alert.   Psychiatric:         Mood and Affect: Mood normal.         Behavior: Behavior normal.         Assessment/Plan   Problem List Items Addressed This Visit           ICD-10-CM    Asthma J45.909    Pt on albuterol prn  Has been stable         Hyperthyroidism E05.90    Pt seen art [facility] ER 4/22/25 for increased HR/palpitations  Cxr showed remote granulomatous dz, d dimer neg, EKG sinus tach, tsh low, t4 high cbc and cmp ok  Dx hyperthyroid  Pt had  stopped her methimazole  Pt given ivf atenolol and " methimazole in ER with improvement  Pt needs to f/up with endo  Referral made today         Relevant Orders    Referral to Endocrinology    Encounter for follow-up - Primary Z09    Class 1 obesity due to excess calories without serious comorbidity with body mass index (BMI) of 32.0 to 32.9 in adult E66.811, E66.09, Z68.32    Advise healthy diet and exercise

## 2025-04-29 ASSESSMENT — ENCOUNTER SYMPTOMS
HEADACHES: 0
CHILLS: 0
DIZZINESS: 0
SHORTNESS OF BREATH: 0
FEVER: 0

## 2025-04-29 NOTE — PROGRESS NOTES
"FUV for hyperthyroidism. LV with me 09/2024.    Subjective   See Aldana \"Italo\" is a 40 y.o. female with a hx of hyperthyroidism and asthma who presents for follow-up.    TSH was normal prior to 02/2024.  Was sick with a fever, chills, and body aches in early February.  PCP checked TFTs which showed hyperthyroidism.    No family hx of thyroid disease that she is aware of.  Son has juvenile rheumatoid arthritis.  Her MICHELLE was recently positive so she is seeing a Rheumatologist for this.    Social Hx: has 4 children, youngest is 5 years old    Today:  -was in the ED on 4/22 for tachycardia. .  -was not taking methimazole at all until after the ED visit above  -was started on atenolol    Review of Systems   Constitutional:  Negative for chills and fever.   Respiratory:  Negative for shortness of breath.    Cardiovascular:  Negative for chest pain.   Neurological:  Negative for dizziness and headaches.   Psychiatric/Behavioral:  Negative for behavioral problems.        Past Medical History:   Diagnosis Date    Acute conjunctivitis 02/28/2024    Allergic rhinitis 02/28/2024    Anxiety     Arthralgia of hip 02/28/2024    Attention disturbance 02/28/2024    Chest pain 02/28/2024    Fatigue 02/28/2024    Fever 02/28/2024    H. pylori infection 02/28/2024    Head lice 02/28/2024    Impacted cerumen 01/29/2023    Inflammation of stomach and intestine 02/28/2024    Menorrhagia 02/28/2024    Otalgia 02/28/2024    Otitis externa 02/28/2024    Pediculosis capitis 02/28/2024    Personal history of other specified conditions 11/29/2021    History of chest pain    Pharyngitis 02/28/2024    Sore throat 02/28/2024    Suspected COVID-19 virus infection 02/28/2024    Suspected severe acute respiratory syndrome coronavirus 2 (SARS-CoV-2) infection 02/28/2024    URI (upper respiratory infection) 02/28/2024       Past Surgical History:   Procedure Laterality Date    CHOLECYSTECTOMY      WISDOM TOOTH EXTRACTION         Social " History     Socioeconomic History    Marital status:      Spouse name: Not on file    Number of children: Not on file    Years of education: Not on file    Highest education level: Not on file   Occupational History    Not on file   Tobacco Use    Smoking status: Never    Smokeless tobacco: Never   Vaping Use    Vaping status: Never Used   Substance and Sexual Activity    Alcohol use: Never    Drug use: Never    Sexual activity: Yes     Partners: Male     Birth control/protection: Condom Male   Other Topics Concern    Not on file   Social History Narrative    Not on file     Social Drivers of Health     Financial Resource Strain: Not on file   Food Insecurity: Not on file   Transportation Needs: Not on file   Physical Activity: Not on file   Stress: Not on file (11/9/2024)   Social Connections: Not on file   Intimate Partner Violence: Not on file   Housing Stability: Not on file       Objective    There were no vitals taken for this visit.  Physical Exam  Constitutional:       General: She is not in acute distress.     Appearance: Normal appearance.   Eyes:      Extraocular Movements: Extraocular movements intact.   Neck:      Thyroid: No thyromegaly.      Comments: No palpable nodules. No tenderness with palpation.  Musculoskeletal:      Right lower leg: No edema.      Left lower leg: No edema.   Neurological:      Mental Status: She is alert and oriented to person, place, and time.       Current Outpatient Medications:     albuterol 90 mcg/actuation inhaler, Inhale 2 puffs every 4 hours if needed for wheezing or shortness of breath., Disp: 8.5 g, Rfl: 0    atenolol (Tenormin) 25 mg tablet, Take 1 tablet (25 mg) by mouth once daily for 20 days., Disp: 20 tablet, Rfl: 0    meclizine (Antivert) 25 mg tablet, Take 1 tablet (25 mg) by mouth 3 times a day as needed for dizziness., Disp: 30 tablet, Rfl: 1    methIMAzole (Tapazole) 5 mg tablet, Take 1 tablet (5 mg) by mouth once daily., Disp: 30 tablet, Rfl: 5     "omeprazole (PriLOSEC) 40 mg DR capsule, TAKE 1 CAPSULE (40 MG) BY MOUTH ONCE DAILY IN THE MORNING. TAKE BEFORE MEALS. DO NOT CRUSH OR CHEW., Disp: 90 capsule, Rfl: 1    ondansetron (Zofran) 4 mg tablet, Take 1 tablet (4 mg) by mouth every 8 hours if needed for nausea or vomiting., Disp: 30 tablet, Rfl: 0     Latest Reference Range & Units 08/06/21 11:30 01/31/23 13:21 02/28/24 08:50 09/06/24 10:10 04/22/25 19:18   Triiodothyronine, Free 2.3 - 4.2 pg/mL    6.5 (H)    Thyroxine, Free 0.61 - 1.12 ng/dL   1.46 (H) 1.96 (H) 3.23 (H)   Thyroid Stimulating Hormone 0.44 - 3.98 mIU/L 1.62 1.75 <0.01 (L) 0.02 (L) <0.01 (L)   TSH Receptor AB <=1.75 IU/L    1.45        Assessment/Plan   See Aldana \"Italo\" is a 40 y.o. female with a hx of hyperthyroidism and asthma who presents for follow-up     Hyperthyroidism  Labs from 9/6/2024  TSH 0.02  FT4 1.96 (N: 0.78-1.48)  FT3 6.5 (N: 2.3-4.2)  TRAb 1.45 (N<1.75)    NM Thyroid uptake scan (09/2024): increase uptake (46% (N: 10-30%), diffuse, bilateral, no focal uptake    Thyroid US (09/2024):   Right lobe:   -mid pole; 0.6 x 0.4 x 0.3 cm, isoechoic, solid nodule   Left lobe: no nodules     Started methimazole 09/2024.  Current regimen: methimazole 5 mg QDAY    Labs from 04/2025  TSH < 0.01  FT4 3.23    Etiology of hyperthyroidism is TRAb negative Graves' disease or mild toxic MNG.    She did not have repeat TFTs done until this month after starting methimazole in 09/2024.  She is more hyperthyroid than September. She was not taking methimazole and had an ED visit for tachycardia () which scared her into starting to take the methimazole.  She was given atenolol by the ED. HR 90 today.    FT4 is significantly higher than what it was last fall. Current dose of methimazole is not enough at this time. Will increase.    Discussed the importance of adherence to methimazole and periodic blood work.  Discussed that taking high doses of methimazole for longer than needed can cause " hypothyroidism.  Discussed that atenolol will need to be tapered off once she is euthyroid. Advised against abrupt discontinuation.    She asked about GLP-1 RA and dual agonists for weight loss.  Recommended she check with her insurance regarding Wegovy and Zepbound.  She is interested in seeing weight management.    PLAN:  -increase methimazole to 10 mg BID  -continue atenolol 25 mg QDAY  -check TSH, FT4, FT3 in 3 weeks  -refer to weight management    Follow up in 3 months with Dr. Ac ([facility])  Uses [facility].

## 2025-05-05 ENCOUNTER — APPOINTMENT (OUTPATIENT)
Dept: ENDOCRINOLOGY | Facility: CLINIC | Age: 40
End: 2025-05-05
Payer: COMMERCIAL

## 2025-05-05 VITALS
BODY MASS INDEX: 32.44 KG/M2 | WEIGHT: 190 LBS | HEART RATE: 90 BPM | SYSTOLIC BLOOD PRESSURE: 130 MMHG | HEIGHT: 64 IN | DIASTOLIC BLOOD PRESSURE: 81 MMHG

## 2025-05-05 DIAGNOSIS — E05.90 HYPERTHYROIDISM: ICD-10-CM

## 2025-05-05 PROCEDURE — 3008F BODY MASS INDEX DOCD: CPT | Performed by: STUDENT IN AN ORGANIZED HEALTH CARE EDUCATION/TRAINING PROGRAM

## 2025-05-05 PROCEDURE — 99215 OFFICE O/P EST HI 40 MIN: CPT | Performed by: STUDENT IN AN ORGANIZED HEALTH CARE EDUCATION/TRAINING PROGRAM

## 2025-05-05 RX ORDER — ATENOLOL 25 MG/1
25 TABLET ORAL DAILY
Qty: 30 TABLET | Refills: 2 | Status: SHIPPED | OUTPATIENT
Start: 2025-05-05

## 2025-05-05 RX ORDER — METHIMAZOLE 5 MG/1
10 TABLET ORAL 2 TIMES DAILY
Qty: 120 TABLET | Refills: 5 | Status: SHIPPED | OUTPATIENT
Start: 2025-05-05

## 2025-05-05 ASSESSMENT — PATIENT HEALTH QUESTIONNAIRE - PHQ9
2. FEELING DOWN, DEPRESSED OR HOPELESS: NOT AT ALL
1. LITTLE INTEREST OR PLEASURE IN DOING THINGS: NOT AT ALL
SUM OF ALL RESPONSES TO PHQ9 QUESTIONS 1 AND 2: 0

## 2025-05-05 ASSESSMENT — PAIN SCALES - GENERAL: PAINLEVEL_OUTOF10: 0-NO PAIN

## 2025-05-05 NOTE — PATIENT INSTRUCTIONS
Increase methimazole to 2 tablets, twice a day  Please have blood work done during the week of 5/26

## 2025-05-13 DIAGNOSIS — E55.9 VITAMIN D DEFICIENCY: ICD-10-CM

## 2025-05-15 RX ORDER — ERGOCALCIFEROL 1.25 MG/1
50000 CAPSULE ORAL
Qty: 12 CAPSULE | Refills: 0 | OUTPATIENT
Start: 2025-05-18 | End: 2025-08-10

## 2025-05-26 DIAGNOSIS — E05.90 HYPERTHYROIDISM: ICD-10-CM

## 2025-05-28 LAB
T3FREE SERPL-MCNC: 5.6 PG/ML (ref 2.3–4.2)
T4 FREE SERPL-MCNC: 1.7 NG/DL (ref 0.8–1.8)
TSH SERPL-ACNC: <0.01 MIU/L

## 2025-05-29 ENCOUNTER — PATIENT MESSAGE (OUTPATIENT)
Dept: ENDOCRINOLOGY | Facility: CLINIC | Age: 40
End: 2025-05-29
Payer: COMMERCIAL

## 2025-05-29 DIAGNOSIS — E05.90 HYPERTHYROIDISM: ICD-10-CM

## 2025-05-29 DIAGNOSIS — E05.90 HYPERTHYROIDISM: Primary | ICD-10-CM

## 2025-05-29 RX ORDER — ATENOLOL 50 MG/1
50 TABLET ORAL DAILY
Qty: 30 TABLET | Refills: 3 | Status: SHIPPED | OUTPATIENT
Start: 2025-05-29 | End: 2026-05-29

## 2025-06-10 ENCOUNTER — APPOINTMENT (OUTPATIENT)
Dept: ENDOCRINOLOGY | Facility: CLINIC | Age: 40
End: 2025-06-10
Payer: COMMERCIAL

## 2025-06-10 DIAGNOSIS — E05.90 HYPERTHYROIDISM: ICD-10-CM

## 2025-06-10 DIAGNOSIS — E66.811 CLASS 1 OBESITY WITHOUT SERIOUS COMORBIDITY WITH BODY MASS INDEX (BMI) OF 32.0 TO 32.9 IN ADULT, UNSPECIFIED OBESITY TYPE: Primary | ICD-10-CM

## 2025-06-10 DIAGNOSIS — Z87.898 HISTORY OF PALPITATIONS: ICD-10-CM

## 2025-06-10 PROCEDURE — 1036F TOBACCO NON-USER: CPT | Performed by: NURSE PRACTITIONER

## 2025-06-10 PROCEDURE — 99214 OFFICE O/P EST MOD 30 MIN: CPT | Performed by: NURSE PRACTITIONER

## 2025-06-10 ASSESSMENT — ENCOUNTER SYMPTOMS
POLYPHAGIA: 0
DYSPHORIC MOOD: 0
FATIGUE: 0
NERVOUS/ANXIOUS: 0
POLYDIPSIA: 0
WHEEZING: 0
FREQUENCY: 0
NAUSEA: 0
CONSTIPATION: 0
SHORTNESS OF BREATH: 0
PALPITATIONS: 0
NUMBNESS: 0
ARTHRALGIAS: 0

## 2025-06-10 NOTE — PATIENT INSTRUCTIONS
Nutrition: Have the consult with the dietitians. High protein and high fiber. Aim at 20--30 grams for meals (90 grams harriet day). Mediterranean approach-poultry, fish, beans, tofu. Morning-protein shake (Fairlife, Premier) and fruit or smoothie. High fiber- berries, apples, carrots, green leafy-spinach, spring greens  Physical Exercise: Continue with walking. Try to begin to incorporate coty resistance and weights to build lean muscle mass- cycling with resistance, resistance bands, Pilates, yoga, low weight workout. Physical Exercise-YouTube or Apps: for free weight workouts-[employer], HasFit, Burn Boot Camps -do modifications. Building muscle mass is important with weight loss process and maintenance of weight. It also benefit bone health and strength and decreases falls risk and assists with balance.  It additionally increases our resting metabolic states and decreases risk of muscle wasting with weight loss and in use of the GLP1 medications, additionally taking in protein rich foods is important.  Medications: Please see below on injections. Call the insurance and see if the plan includes Wegovy or Zepbound  Options for paying out of pocket for GLP1 weight  medications (Zepbound, Wegovy) include the following:  [employer] [place] pharmacy sells the Semaglutide, the medication in Wegovy, for 200-275 for a one month supply. This medication formula does include vitamin B in it as well (versus the brand name that does not contain vitamin B), this is due to the compound brand being required  to be 10% different from the company's patented formula per FDA regulation. [employer] is the [employer] affordability program for  Wegovy medication. Wegovy  can be purchased through the program for 399 dollars per month for one month supply of the medication. [employer] is the [employer] affordability program. The  Zepbound  can be purchased from 349 to 499 dollars per month for a one month  supply of the medication.  Some  patients on the GLP1 medications (Ozempic, Mounjaro, Wegovy, Zepboud) can experience nausea and/or constipation. For the nausea I recommend using Tums over the counter first, if this is not effective we can use some Zofran. Avoid highly processed and fried foods. Make sure to avoid constipation by having high fiber foods (veggies, fruit). Make sure to also have enough water with at least 64 ounces per day. If the bowels are moving very slow and you feel constipated use Miralax or Benefiber which you can purchase at a local pharmacy.  We reviewed information on .Zepbound Give it weekly, rotate sites every week in the abdomen or the thigh.  It will slow movement of the bowels for feeling of fullness and  you will also have decreased sense of hunger and decreased appetite. If this does not occur at the beginning dose it will occur as we increase the dose. The dose is increased every 4 weeks. Please message me once you take the third dose so we can discuss if we will increase the dose and I can put in another order. Common side effects are GI side effects of nausea and constipation and typically subside over time. Please inform me of intolerable side effects.  Follow up: RD visit first, group visit, [facility]

## 2025-06-10 NOTE — PROGRESS NOTES
"This is a virtual visit where physical exam is limited and ROS and hx is obtained.    Italo Aldana presents for weight loss management and obesity consult today.Referred by Dr. Huang    [facility]: Hyperthyroid, palpitations  Followed by endocrinologist Yevgeniy for thyroid    Obesity History:  \"It's bee a challenge, I have 4 children and once started having them started harder to manage it and once I got in my 30's, before I could manage   it with strict diets and working out\"     Childhood or teen obesity history: no  Age of Onset: early adult years  Lowest adult weight: 150  Highest adult weight: 205  Current weight: 190     Family history of obesity: yes    Biggest challenge with weight management: trying to lose it, interventions not working   Goals: to feel healthy \"I am feeling the extra weight in my back and in movement, I would like to lose 30 pounds\"     History of Weight Loss Efforts: yes  Successful weight loss techniques attempted: self-directed dieting and working- in the past these worked but not as successful recently  Unsuccessful weight loss techniques attempted: nutritionist consultation    Current typical daily diet:  Keeps a very busy schedule- work, school, 4 children  Typical Breakfast: skipped most times  Typical Lunch: \"whatever I can find at work-wrap, salad\"  Typical Dinner: cooks at home, preps at home, chicken, casserole dish  Soda/latte weekly intake: water, coffee  Take out/carry out/fast food weekly:  Portion sizes/seconds with meals: medium    Snacking  Daytime snacks: no  Evening snacks: no      Describe Appetite (always hungry/no hunger/average): hunger throughout the day and this has changed over the last yea, I will wake up hungry  Are you full after a meal?  yes  Food Noise: Yes \"I think about my meals, I get so hungry and think about the ne  Emotional eater? No   Boredom eater? No     Current Exercise Habits walking for an hour nightly    Sleep patterns (insomnia, waking in night) " /hours of sleep per night: no STEVEN  H/O Sleep apnea: no  Well rested? No     Increased Stressors (describe daily stress): yes      Any intake of an appetite suppressant or an anti-obesity medicine? No     H/O Pancreatitis: no  H/O Thyroid Medullary Cancer: no    Medical History[1]    Current Medications[2]        Review of Systems  Review of Systems   Constitutional:  Negative for fatigue.   Respiratory:  Negative for shortness of breath and wheezing.    Cardiovascular:  Negative for chest pain and palpitations.   Gastrointestinal:  Negative for constipation and nausea.   Endocrine: Negative for polydipsia, polyphagia and polyuria.   Genitourinary:  Negative for frequency and urgency.   Musculoskeletal:  Negative for arthralgias.   Skin:  Negative for rash.   Neurological:  Negative for numbness.   Psychiatric/Behavioral:  Negative for dysphoric mood. The patient is not nervous/anxious.            Objective   There were no vitals taken for this visit.    Physical Exam  Physical Exam  Cardiovascular:      Rate and Rhythm: Normal rate.      Pulses: Normal pulses.   Neurological:      Mental Status: She is alert.   Psychiatric:         Mood and Affect: Mood normal.         Behavior: Behavior normal.         Thought Content: Thought content normal.         Judgment: Judgment normal.         Lab Review  Lab Results   Component Value Date    CHOL 184 02/28/2024     Lab Results   Component Value Date    HDL 56.6 02/28/2024     Lab Results   Component Value Date    LDLCALC 111 (H) 02/28/2024     Lab Results   Component Value Date    TRIG 82 02/28/2024       Lab Results   Component Value Date    LDLCALC 111 (H) 02/28/2024     FIB-4 Calculation: 0.4 at 4/22/2025  7:18 PM  Calculated from:  SGOT/AST: 24 U/L at 4/22/2025  7:18 PM  SGPT/ALT: 43 U/L at 4/22/2025  7:18 PM  Platelets: 352 x10*3/uL at 4/22/2025  7:18 PM  Age: 40 years    Weight Trends  Trends of weight reviewed in visit, see graph below:  Wt Readings from Last 3  Encounters:   05/05/25 86.2 kg (190 lb)   04/23/25 84.8 kg (187 lb)   04/22/25 86.2 kg (190 lb)   (  Assessment/Plan     Follow up and Goals:  Nutrition: Have the consult with the dietitians. High protein and high fiber. Aim at 20--30 grams for meals (90 grams harriet day). Mediterranean approach-poultry, fish, beans, tofu. Morning-protein shake (Fairlife, Premier) and fruit or smoothie. High fiber- berries, apples, carrots, green leafy-spinach, spring greens  Physical Exercise: Continue with walking. Try to begin to incorporate coty resistance and weights to build lean muscle mass- cycling with resistance, resistance bands, Pilates, yoga, low weight workout. Physical Exercise-YouTube or Apps: for free weight workouts-[employer] Juice, HasFit, Burn Boot Camps -do modifications. Building muscle mass is important with weight loss process and maintenance of weight. It also benefit bone health and strength and decreases falls risk and assists with balance.  It additionally increases our resting metabolic states and decreases risk of muscle wasting with weight loss and in use of the GLP1 medications, additionally taking in protein rich foods is important.  Medications: Please see below on injections. Call the insurance and see if the plan includes Wegovy or Zepbound  Options for paying out of pocket for GLP1 weight  medications (Zepbound, Wegovy) include the following:  [email or web address] the Semaglutide, the medication in Wegovy, for 200-275 for a one month supply. This medication formula does include vitamin B in it as well (versus the brand name that does not contain vitamin B), this is due to the compound brand being required  to be 10% different from the company's patented formula per FDA regulation. [employer] is the [employer] affordability program for  Wegovy medication. Wegovy  can be purchased through the program for 399 dollars per month for one month supply of the medication. [employer] is the Noemi Salcedo  affordability program. The  Zepbound  can be purchased from 349 to 499 dollars per month for a one month  supply of the medication.  Some patients on the GLP1 medications (Ozempic, Mounjaro, Wegovy, Zepboud) can experience nausea and/or constipation. For the nausea I recommend using Tums over the counter first, if this is not effective we can use some Zofran. Avoid highly processed and fried foods. Make sure to avoid constipation by having high fiber foods (veggies, fruit). Make sure to also have enough water with at least 64 ounces per day. If the bowels are moving very slow and you feel constipated use Miralax or Benefiber which you can purchase at a local pharmacy.  We reviewed information on .Zepbound Give it weekly, rotate sites every week in the abdomen or the thigh.  It will slow movement of the bowels for feeling of fullness and  you will also have decreased sense of hunger and decreased appetite. If this does not occur at the beginning dose it will occur as we increase the dose. The dose is increased every 4 weeks. Please message me once you take the third dose so we can discuss if we will increase the dose and I can put in another order. Common side effects are GI side effects of nausea and constipation and typically subside over time. Please inform me of intolerable side effects.  Follow up: RD visit first, group visit, [facility]      Visit length of 45 minutes      Problem List Items Addressed This Visit    None      Diet interventions: referral to dietitian for guidance in these changes  Discussed Mediterranean Diet, given pamphlet or it will be mailed, we looked at ADA plate, portion sizes, recipes. Advised to review in preparation for nutrition consult    Handouts given: yes    Exercise intervention:   We discussed the importance of incorporating resistance and free weight  lifting into physical activity routine to prevent muscle wasting with weight loss, enhance bone health, the positive  role increased muscle has in burning fat at rest. We looked at examples of these types of exercise routines online. Advised to do modifications. Advised to check with PCP if there is a h/o musculoskeletal injury or hx. We discussed benefits of walking with weight loss and as a cardio form of exercise. Gradually increase exercise to a goal of 150 minutes at least per week.          Follow Up:  Referred to nutrition or continue to work with current dietitian   Discussed obesity medications, side effects and mechanism of action reviewed with patient  Discussed physical activity: we reviewed [employer] videos for strength training, advised to use modifications for these videos, we discussed benefits of strength training and resistance bands  on bone and muscle health, we discussed walking and water aerobic options for cardio  Discussed Mediterranean Diet, given pamphlet or it will be mailed, we looked at ADA plate, portion sizes, recipes. Advised to review Mediterranean Meal Plan booklet  in preparation for nutrition consult  ....  1 month group visit and nutrition visit in person or  virtual  Please reach out if you need anything or have further questions         [1]   Past Medical History:  Diagnosis Date    Acute conjunctivitis 02/28/2024    Allergic rhinitis 02/28/2024    Anxiety     Arthralgia of hip 02/28/2024    Attention disturbance 02/28/2024    Chest pain 02/28/2024    Fatigue 02/28/2024    Fever 02/28/2024    H. pylori infection 02/28/2024    Head lice 02/28/2024    Impacted cerumen 01/29/2023    Inflammation of stomach and intestine 02/28/2024    Menorrhagia 02/28/2024    Otalgia 02/28/2024    Otitis externa 02/28/2024    Pediculosis capitis 02/28/2024    Personal history of other specified conditions 11/29/2021    History of chest pain    Pharyngitis 02/28/2024    Sore throat 02/28/2024    Suspected COVID-19 virus infection 02/28/2024    Suspected severe acute respiratory syndrome coronavirus 2 (SARS-CoV-2)  infection 02/28/2024    URI (upper respiratory infection) 02/28/2024   [2]   Current Outpatient Medications   Medication Sig Dispense Refill    albuterol 90 mcg/actuation inhaler Inhale 2 puffs every 4 hours if needed for wheezing or shortness of breath. 8.5 g 0    atenolol (Tenormin) 50 mg tablet Take 1 tablet (50 mg) by mouth once daily. 30 tablet 3    meclizine (Antivert) 25 mg tablet Take 1 tablet (25 mg) by mouth 3 times a day as needed for dizziness. 30 tablet 1    methIMAzole (Tapazole) 5 mg tablet Take 2 tablets (10 mg) by mouth 2 times a day. 120 tablet 5    omeprazole (PriLOSEC) 40 mg DR capsule TAKE 1 CAPSULE (40 MG) BY MOUTH ONCE DAILY IN THE MORNING. TAKE BEFORE MEALS. DO NOT CRUSH OR CHEW. 90 capsule 1    ondansetron (Zofran) 4 mg tablet Take 1 tablet (4 mg) by mouth every 8 hours if needed for nausea or vomiting. 30 tablet 0     No current facility-administered medications for this visit.

## 2025-06-18 ENCOUNTER — APPOINTMENT (OUTPATIENT)
Dept: ENDOCRINOLOGY | Facility: CLINIC | Age: 40
End: 2025-06-18
Payer: COMMERCIAL

## 2025-06-21 ENCOUNTER — ANCILLARY PROCEDURE (OUTPATIENT)
Dept: URGENT CARE | Age: 40
End: 2025-06-21
Payer: COMMERCIAL

## 2025-06-21 ENCOUNTER — OFFICE VISIT (OUTPATIENT)
Dept: URGENT CARE | Age: 40
End: 2025-06-21
Payer: COMMERCIAL

## 2025-06-21 VITALS
HEART RATE: 82 BPM | TEMPERATURE: 97.7 F | OXYGEN SATURATION: 99 % | SYSTOLIC BLOOD PRESSURE: 121 MMHG | DIASTOLIC BLOOD PRESSURE: 81 MMHG | RESPIRATION RATE: 14 BRPM

## 2025-06-21 DIAGNOSIS — S60.012A CONTUSION OF LEFT THUMB WITHOUT DAMAGE TO NAIL, INITIAL ENCOUNTER: ICD-10-CM

## 2025-06-21 DIAGNOSIS — S63.642A SPRAIN OF METACARPOPHALANGEAL (MCP) JOINT OF LEFT THUMB, INITIAL ENCOUNTER: ICD-10-CM

## 2025-06-21 DIAGNOSIS — S60.012A CONTUSION OF LEFT THUMB WITHOUT DAMAGE TO NAIL, INITIAL ENCOUNTER: Primary | ICD-10-CM

## 2025-06-21 PROCEDURE — 73130 X-RAY EXAM OF HAND: CPT | Mod: LEFT SIDE | Performed by: FAMILY MEDICINE

## 2025-06-21 NOTE — PROGRESS NOTES
"Subjective   Patient ID: See Aldana \"Italo\" is a 40 y.o. female. She presents today with a chief complaint of Finger Injury (Left Thumb injury yesterday). Patient presents with a 1 day history of left thumb pain, bruising and swelling. She denies associated numbness, weakness and tingling. She injured her left thumb in a bouncy house yesterday. She is right hand dominant. There is no prior history of left hand injuries.    History of Present Illness  HPI    Past Medical History  Allergies as of 06/21/2025    (No Known Allergies)       Prescriptions Prior to Admission[1]     Medical History[2]    Surgical History[3]     reports that she has never smoked. She has never used smokeless tobacco. She reports that she does not drink alcohol and does not use drugs.    Review of Systems  Review of Systems                               Objective    Vitals:    06/21/25 1624   BP: 121/81   BP Location: Right arm   Patient Position: Sitting   BP Cuff Size: Adult   Pulse: 82   Resp: 14   Temp: 36.5 °C (97.7 °F)   SpO2: 99%     No LMP recorded (lmp unknown).    Physical Exam  Vitals and nursing note reviewed.   Constitutional:       General: She is not in acute distress.     Appearance: Normal appearance.   Cardiovascular:      Pulses: Normal pulses.   Musculoskeletal:      Left wrist: Normal.      Left hand: Swelling and bony tenderness present. Decreased range of motion. Normal strength. Normal sensation. There is no disruption of two-point discrimination. Normal capillary refill. Normal pulse.      Comments: Left thumb with ecchymoses, tenderness to palpation at MCP joint with decreased flexion and extension   Skin:     General: Skin is warm.      Capillary Refill: Capillary refill takes less than 2 seconds.   Neurological:      Mental Status: She is alert.      Sensory: No sensory deficit.      Motor: No weakness.         Procedures    Point of Care Test & Imaging Results from this visit  No results found for this visit on " 06/21/25.   Imaging  XR hand left 3+ views  Result Date: 6/21/2025  No fracture or dislocation is evident.   Signed by: Markus Krishna 6/21/2025 4:47 PM Dictation workstation:   [id number]      Cardiology, Vascular, and Other Imaging  No other imaging results found for the past 2 days      Diagnostic study results (if any) were reviewed by Nora Cullen MD.    Assessment/Plan   Allergies, medications, history, and pertinent labs/EKGs/Imaging reviewed by Nora Cullen MD.     Medical Decision Making      Orders and Diagnoses  Diagnoses and all orders for this visit:  Contusion of left thumb without damage to nail, initial encounter  -     XR hand left 3+ views; Future      Medical Admin Record      Patient disposition: Home    Electronically signed by Nora Cullen MD  4:52 PM           [1] (Not in a hospital admission)   [2]   Past Medical History:  Diagnosis Date    Acute conjunctivitis 02/28/2024    Allergic rhinitis 02/28/2024    Anxiety     Arthralgia of hip 02/28/2024    Attention disturbance 02/28/2024    Chest pain 02/28/2024    Fatigue 02/28/2024    Fever 02/28/2024    H. pylori infection 02/28/2024    Head lice 02/28/2024    Impacted cerumen 01/29/2023    Inflammation of stomach and intestine 02/28/2024    Menorrhagia 02/28/2024    Otalgia 02/28/2024    Otitis externa 02/28/2024    Pediculosis capitis 02/28/2024    Personal history of other specified conditions 11/29/2021    History of chest pain    Pharyngitis 02/28/2024    Sore throat 02/28/2024    Suspected COVID-19 virus infection 02/28/2024    Suspected severe acute respiratory syndrome coronavirus 2 (SARS-CoV-2) infection 02/28/2024    URI (upper respiratory infection) 02/28/2024   [3]   Past Surgical History:  Procedure Laterality Date    CHOLECYSTECTOMY      WISDOM TOOTH EXTRACTION

## 2025-06-21 NOTE — PATIENT INSTRUCTIONS
Ibuprofen as needed  Apply ice as needed  Splint  Follow up with [facility], Dr. Palacios, if no improvement

## 2025-06-23 DIAGNOSIS — E05.90 HYPERTHYROIDISM: ICD-10-CM

## 2025-06-24 ENCOUNTER — OFFICE VISIT (OUTPATIENT)
Dept: ORTHOPEDIC SURGERY | Facility: CLINIC | Age: 40
End: 2025-06-24
Payer: COMMERCIAL

## 2025-06-24 ENCOUNTER — HOSPITAL ENCOUNTER (OUTPATIENT)
Dept: RADIOLOGY | Facility: CLINIC | Age: 40
Discharge: HOME | End: 2025-06-24
Payer: COMMERCIAL

## 2025-06-24 DIAGNOSIS — M79.645 PAIN OF LEFT THUMB: ICD-10-CM

## 2025-06-24 DIAGNOSIS — S62.522A DISPLACED FRACTURE OF DISTAL PHALANX OF LEFT THUMB, INITIAL ENCOUNTER FOR CLOSED FRACTURE: ICD-10-CM

## 2025-06-24 PROCEDURE — 26750 TREAT FINGER FRACTURE EACH: CPT | Performed by: ORTHOPAEDIC SURGERY

## 2025-06-24 PROCEDURE — 1036F TOBACCO NON-USER: CPT | Performed by: ORTHOPAEDIC SURGERY

## 2025-06-24 PROCEDURE — 73140 X-RAY EXAM OF FINGER(S): CPT | Mod: LEFT SIDE | Performed by: ORTHOPAEDIC SURGERY

## 2025-06-24 PROCEDURE — 99204 OFFICE O/P NEW MOD 45 MIN: CPT | Performed by: ORTHOPAEDIC SURGERY

## 2025-06-24 PROCEDURE — 99212 OFFICE O/P EST SF 10 MIN: CPT | Performed by: ORTHOPAEDIC SURGERY

## 2025-06-24 PROCEDURE — 73140 X-RAY EXAM OF FINGER(S): CPT | Mod: LT

## 2025-06-24 NOTE — PROGRESS NOTES
6/24/2025    Chief Complaint   Patient presents with    Left Thumb - New Patient Visit, Injury     Contusion  DOI: 6/21/25  Xrays @  6/21/25       History of Present Illness:  Patient See Aldana , 40 y.o. female, presents today, 6/24/2025, for evaluation of left thumb pain, swelling, and bruising.  Injury occurred about 4 days ago over the weekend.  She states it was her son's graduation party she was getting out of a bouncy house and had a mechanical fall where she landed directly on the left thumb.  She had pain and discomfort.  This worsened the next day and she went to urgent care where she was diagnosed with a sprain/contusion.  She describes continued pain, stiffness, swelling, and ecchymosis.  She is right-hand dominant individual and otherwise healthy.       Review of Systems:   GENERAL: Negative  GI: Negative  MUSCULOSKELETAL: See HPI  SKIN: Negative  NEURO:  Negative     Physical Exam:  GENERAL:  Alert and oriented to person, place, and time.  No acute distress and breathing comfortably; pleasant and cooperative with the examination.  HEENT:  Head is normocephalic and atraumatic.  NECK:  Supple, no visible swelling.  CARDIOVASCULAR:  No palpable tachycardia.  LUNGS:  No audible wheezing or labored breathing.  ABDOMEN:  Nondistended.  Extremities: Evaluation of left upper extremity finds the patient to have a palpable radial artery at the wrist with brisk capillary refill to all digits. The patient has intact sensorium to axillary, radial, median and ulnar nerves. There are no open wounds. There are no signs of infection. There is no evidence of lymphedema or lymphatic streaking. The patient has supple compartments of the left arm, forearm and hand.  The thumb itself is ecchymotic and tender and swollen.  Most of the tenderness is localized over the distal phalanx and interphalangeal joint.  She is nontender to palpation over the metacarpal phalangeal joint.  Flexion extension across the  interphalangeal joint is maintained but limited secondary to pain and swelling.     Imaging/Test Results:  Radiographs of the left thumb show evidence of minimally displaced left thumb intra-articular P2 base fracture.     Assessment:  Left thumb distal phalanx fracture, 4 days out from injury.     Plan:  Treatment options were reviewed at length including operative and nonoperative strategies.  Recommendations are made for surgical intervention for closed reduction percutaneous pin fixation versus ORIF of the distal phalanx.  However, patient has strong preference for nonoperative management.  She elects to do a week of nonweightbearing and U-shaped splint immobilization.  She will remove this only for hygiene and bathing.  She will follow-up in 1 week for repeat clinical and radiographic exam, x-rays 3 views left thumb upon return.  All questions answered today's visit.    In a face to face encounter, I performed a history and physical examination, discussed pertinent diagnostic studies if indicated, and discussed diagnosis and management strategies with both the patient and the mid-level provider. I reviewed the mid-level's note and agree with the documented findings and plan of care.  Patient presents today for evaluation of the left thumb.  Injured in a bounce house.  Localizes pain to the distal phalanx.  Ecchymosis and swelling along with focal tenderness to left thumb at distal phalanx.  X-rays demonstrate nondisplaced intra-articular fracture left thumb P2.  Treatment options were discussed.  We talked about the risk of nonunion and articular malunion.  She elects to proceed forth with initial nonoperative management by way of splinting.  1 week follow-up for close clinical and radiographic follow-up.  IP flexion and extension intact.  Patient is agreeable with this strategy.  X-rays left thumb upon return.  She was placed into U-shaped AlumaFoam splint allowing for MCP motion CMC motion but blocking IP  motion.

## 2025-06-25 DIAGNOSIS — E66.811 CLASS 1 OBESITY WITHOUT SERIOUS COMORBIDITY WITH BODY MASS INDEX (BMI) OF 32.0 TO 32.9 IN ADULT, UNSPECIFIED OBESITY TYPE: Primary | ICD-10-CM

## 2025-06-28 LAB
T3FREE SERPL-MCNC: 6.3 PG/ML (ref 2.3–4.2)
T4 FREE SERPL-MCNC: 1.9 NG/DL (ref 0.8–1.8)
TSH SERPL-ACNC: <0.01 MIU/L

## 2025-07-01 ENCOUNTER — HOSPITAL ENCOUNTER (OUTPATIENT)
Dept: RADIOLOGY | Facility: CLINIC | Age: 40
Discharge: HOME | End: 2025-07-01
Payer: COMMERCIAL

## 2025-07-01 ENCOUNTER — OFFICE VISIT (OUTPATIENT)
Dept: ORTHOPEDIC SURGERY | Facility: CLINIC | Age: 40
End: 2025-07-01
Payer: COMMERCIAL

## 2025-07-01 DIAGNOSIS — S62.522A DISPLACED FRACTURE OF DISTAL PHALANX OF LEFT THUMB, INITIAL ENCOUNTER FOR CLOSED FRACTURE: ICD-10-CM

## 2025-07-01 DIAGNOSIS — S62.522A DISPLACED FRACTURE OF DISTAL PHALANX OF LEFT THUMB, INITIAL ENCOUNTER FOR CLOSED FRACTURE: Primary | ICD-10-CM

## 2025-07-01 PROCEDURE — 1036F TOBACCO NON-USER: CPT | Performed by: ORTHOPAEDIC SURGERY

## 2025-07-01 PROCEDURE — 73140 X-RAY EXAM OF FINGER(S): CPT | Mod: LT

## 2025-07-01 PROCEDURE — 99024 POSTOP FOLLOW-UP VISIT: CPT | Performed by: ORTHOPAEDIC SURGERY

## 2025-07-01 PROCEDURE — 99212 OFFICE O/P EST SF 10 MIN: CPT | Performed by: ORTHOPAEDIC SURGERY

## 2025-07-01 PROCEDURE — L3809 WHFO W/O JOINTS PRE OTS: HCPCS | Performed by: ORTHOPAEDIC SURGERY

## 2025-07-01 PROCEDURE — 73140 X-RAY EXAM OF FINGER(S): CPT | Mod: LEFT SIDE | Performed by: ORTHOPAEDIC SURGERY

## 2025-07-01 NOTE — PROGRESS NOTES
7/1/2025    Chief Complaint   Patient presents with    Left Thumb - Fracture     Diqtal phalanx fx  DOI: 6/21/25  Xrays today         History of Present Illness:  Patient See Aldana , 40 y.o. female, presents today, 7/1/2025, for evaluation of left thumb distal phalanx fracture, 10 days out from injury and nonoperative management.  Patient reports compliance with splint immobilization and nonweightbearing.  If anything she feels that the splint is not sturdy enough.  She has some upcoming travel and is requesting something more robust.  She still strong preference for nonoperative management.         Review of Systems:   GENERAL: Negative  GI: Negative  MUSCULOSKELETAL: See HPI  SKIN: Negative  NEURO:  Negative     Physical Exam:  GENERAL:  Alert and oriented to person, place, and time.  No acute distress and breathing comfortably; pleasant and cooperative with the examination.  HEENT:  Head is normocephalic and atraumatic.  NECK:  Supple, no visible swelling.  CARDIOVASCULAR:  No palpable tachycardia.  LUNGS:  No audible wheezing or labored breathing.  ABDOMEN:  Nondistended.  Extremities: Evaluation of left upper extremity finds the patient to have a palpable radial artery at the wrist with brisk capillary refill to all digits. The patient has intact sensorium to axillary, radial, median and ulnar nerves. There are no open wounds. There are no signs of infection. There is no evidence of lymphedema or lymphatic streaking. The patient has supple compartments of the left arm, forearm and hand.  Swelling and ecchymosis improving.   palpation over the distal phalanx.  Flexion extension maintained intact across the IP joint.     Imaging/Test Results:  3 views of the left thumb show evidence of continued adequate alignment throughout all 3 planes of intra-articular left thumb distal phalanx fracture.  No loss of reduction noted from previous exam.     Assessment:  Left thumb distal phalanx fracture,  10 days out from nonoperative management.     Plan:  Treatment options were reviewed including operative and nonoperative strategies.  Patient strong preference for continued nonoperative management.  We elect for continued strict nonweightbearing and immobilization coming out of daily just for hygiene purposes.  Will transition to long thumb spica thermoplastic splint that will be molded and fit today extending to the tip of the digit.  Patient will follow-up in about 2-1/2 weeks for repeat clinical and radiographic exam, x-rays 3 views left thumb upon return.  All questions answered at today's visit.      Cyndie White PA-C

## 2025-07-07 DIAGNOSIS — E66.811 CLASS 1 OBESITY WITHOUT SERIOUS COMORBIDITY WITH BODY MASS INDEX (BMI) OF 32.0 TO 32.9 IN ADULT, UNSPECIFIED OBESITY TYPE: ICD-10-CM

## 2025-07-07 RX ORDER — TIRZEPATIDE 5 MG/.5ML
5 INJECTION, SOLUTION SUBCUTANEOUS
Qty: 4 EACH | Refills: 2 | Status: SHIPPED | OUTPATIENT
Start: 2025-07-07

## 2025-07-16 ENCOUNTER — APPOINTMENT (OUTPATIENT)
Dept: OPHTHALMOLOGY | Facility: CLINIC | Age: 40
End: 2025-07-16
Payer: COMMERCIAL

## 2025-07-17 ENCOUNTER — HOSPITAL ENCOUNTER (OUTPATIENT)
Dept: RADIOLOGY | Facility: CLINIC | Age: 40
Discharge: HOME | End: 2025-07-17
Payer: COMMERCIAL

## 2025-07-17 ENCOUNTER — OFFICE VISIT (OUTPATIENT)
Dept: ORTHOPEDIC SURGERY | Facility: CLINIC | Age: 40
End: 2025-07-17
Payer: COMMERCIAL

## 2025-07-17 DIAGNOSIS — S62.522A DISPLACED FRACTURE OF DISTAL PHALANX OF LEFT THUMB, INITIAL ENCOUNTER FOR CLOSED FRACTURE: ICD-10-CM

## 2025-07-17 PROCEDURE — 73140 X-RAY EXAM OF FINGER(S): CPT | Mod: LT

## 2025-07-17 PROCEDURE — 99212 OFFICE O/P EST SF 10 MIN: CPT | Performed by: ORTHOPAEDIC SURGERY

## 2025-07-17 NOTE — PROGRESS NOTES
7/17/2025    Chief Complaint   Patient presents with    Left Thumb - Follow-up     Distal phalanx fx  DOI: 6/21/25  Xrays today        History of Present Illness:  Patient See Aldana , 40 y.o. female, presents today, 7/17/2025, for evaluation of left thumb distal phalanx fracture, 4 weeks out from nonoperative management.  Patient has been compliant with splinting and nonweightbearing restrictions.  She feels overall she is doing well pain and swelling are greatly decreased.  She feels that the nail is has a slightly abnormal appearance with new growth..         Review of Systems:   GENERAL: Negative  GI: Negative  MUSCULOSKELETAL: See HPI  SKIN: Negative  NEURO:  Negative     Physical Exam:  GENERAL:  Alert and oriented to person, place, and time.  No acute distress and breathing comfortably; pleasant and cooperative with the examination.  HEENT:  Head is normocephalic and atraumatic.  NECK:  Supple, no visible swelling.  CARDIOVASCULAR:  No palpable tachycardia.  LUNGS:  No audible wheezing or labored breathing.  ABDOMEN:  Nondistended.  Extremities: Evaluation of left upper extremity finds the patient to have a palpable radial artery at the wrist with brisk capillary refill to all digits. The patient has intact sensorium to axillary, radial, median and ulnar nerves. There are no open wounds. There are no signs of infection. There is no evidence of lymphedema or lymphatic streaking. The patient has supple compartments of the left arm, forearm and hand.  Only minimal tenderness palpation over the distal phalanx today.  Swelling and ecchymosis greatly resolved.  She still is full flexion and extension across the digit especially the IP joint.     Imaging/Test Results:  3 views of the left thumb show healing distal phalanx fracture with increasing callus formation noted.  Good alignment throughout all 3 planes.     Assessment:  Left thumb distal phalanx fracture, 4 weeks out nonoperative management, with  completeness of healing on radiographs today.     Plan:  We discussed weaning from splint immobilization.  She can progress to weightbearing activities as tolerated.  Instructed on home program for continued motion recovery, desensitization and gentle massage.  We discussed that she had no evidence of open injury or injury to the nail plate at first visit.  There may have been some trauma to the deep structures at the level of the germinal fold.  We recommend for simple observation for now.  We discussed that these changes may be transient to nail growth and this will likely smooth out and remodel more over time.  Follow-up with our office in an as-needed basis.  All questions answered at today's visit.    Cyndie White PA-C       61.2

## 2025-07-28 DIAGNOSIS — E05.90 HYPERTHYROIDISM: ICD-10-CM

## 2025-07-30 ENCOUNTER — DOCUMENTATION (OUTPATIENT)
Dept: ENDOCRINOLOGY | Facility: CLINIC | Age: 40
End: 2025-07-30
Payer: COMMERCIAL

## 2025-08-06 NOTE — PROGRESS NOTES
Endocrinology  08/08/25    History of Present Illness   See Aldana is a 40 y.o. year old female with medical history of hyperthyroidism, here for hyperthyroidism.     Former patient of Dr. Huang.    Thyroid hx:  TSH was normal up until 2/2024. At that time she was ill with fever, chills, myalgias. PCP assessed TFTs and they were consistent with hyperthyroidism.     Went to the ED 4/22/25 with HR of 150; started on MMI and atenolol. Current dose of methimazole is 10 mg BID.     Labs from 9/6/2024  TSH 0.02  FT4 1.96 (N: 0.78-1.48)  FT3 6.5 (N: 2.3-4.2)  TRAb 1.45 (N<1.75)     NM Thyroid uptake scan (09/2024): increase uptake (46% (N: 10-30%), diffuse, bilateral, no focal uptake     Thyroid US (09/2024):   Right lobe:   -mid pole; 0.6 x 0.4 x 0.3 cm, isoechoic, solid nodule   Left lobe: no nodules     Since last seeing Dr. Huang she went through a period of feeling better, then felt worse, and now is getting better. She is waking up in the middle of the night at least once a night feeling like her heart is pounding. She is still taking atenolol 50 mg daily around 4PM. She has not had to use albuterol in >2 years.     Still using caffeine works as  for [employer]. Has 4 kids.     Hyperthyroid symptoms  Appetite changes: denies, but on zepbound  Weight loss: endorses but is on zepbound; states she wasn't losing prior to starting zepbound. Has lost 7 lbs  Heat Intolerance: endorses hot flashes 1-2x/day  Palpitations: at night, maybe during the day but can't tell  Tremor: denies  Diaphoresis: denies  Agitation/Irritability/Anxiety: endorses, increased agitation  Weakness: denies  Insomnia: sleeps lightly (12 or 1AM to 5AM)  Dry/gritty-feeling eyes: denies    Review of Systems   General: Denies fever or chills   Head: Denies headache or vision changes   Neck: Denies tenderness or lumps   Cardiac: Denies chest pain   Respiratory: Denies shortness of breath or cough   GI: Denies abdominal pain, nausea,  "or vomiting   Extremities: Denies swelling   Skin: Denies rash     Medications     Current Outpatient Medications   Medication Instructions    albuterol 90 mcg/actuation inhaler 2 puffs, inhalation, Every 4 hours PRN    atenolol (TENORMIN) 50 mg, oral, Daily    meclizine (ANTIVERT) 25 mg, oral, 3 times daily PRN    methIMAzole (TAPAZOLE) 10 mg, oral, 2 times daily    Zepbound 5 mg, subcutaneous, Every 7 days          History   Medical History[1]    Surgical History[2]    Family History[3]     RX Allergies[4]       Physical Exam   BP (!) 148/96   Pulse 86   Ht 1.626 m (5' 4\")   Wt 83.5 kg (184 lb 1.4 oz)   BMI 31.60 kg/m²   Constitutional: She is well-developed, well-nourished, and in no distress.  No hyperactivity observed, no rapid speech.   Head: Normocephalic, Atrautmatic  Mouth/Throat: Oropharynx is clear and moist  Eyes: no proptosis, no lid lag. Non-icteric.   Neck: Normal range of motion. Neck supple.Thyroid is large and rubbery, no palpably nodules.  Cardiovascular: Normal rate  Pulmonary/Chest: Normal WOB on RA. No respiratory distress.  Musculoskeletal: normal muscle mass, normal muscle tone  Neurological: She is alert. She is not disoriented.  No tremor on BUE extension  Skin: Skin is warm and moist. No pretibial edema  Psychiatric: Appropriate mood and affect        Labs and Imaging      Latest Reference Range & Units 04/22/25 19:18 05/28/25 08:09 06/27/25 12:08   Thyroxine, Free 0.61 - 1.12 ng/dL 3.23 (H)     T4, FREE 0.8 - 1.8 ng/dL  1.7 1.9 (H)   Thyroid Stimulating Hormone 0.44 - 3.98 mIU/L <0.01 (L)     TSH mIU/L  <0.01 (L) <0.01 (L)   (H): Data is abnormally high  (L): Data is abnormally low     Latest Reference Range & Units 09/06/24 10:10 01/07/25 19:31 04/22/25 19:18   WBC 4.4 - 11.3 x10*3/uL 7.7 8.5 9.3      Latest Reference Range & Units 04/22/25 19:18   ALT 7 - 45 U/L 43   AST 9 - 39 U/L 24     Assessment and Plan   See Aldana is a 40 y.o. year old female with medical history of " hyperthyroidism, here for the same.     #Hyperthyroidism  - Dx in 9/2024  - TRAb negative, TSI ordered  - thyroid uptake and scan demonstrated increased and diffuse uptake  - etiology: toxic MNG versus TRAb negative Grave's  - Current MMI dose: 10 mg BID  - recent TFTs demonstrate normal FT4 and FT3 with still suppressed TSH  - decrease MMI to 10/5, repeat labs in 6-8 wks      #Thyroid nodule  - 9/2024 R lobe mid pole; 0.6 x 0.4 x 0.3 cm, isoechoic, solid nodule   - will reassess this fall to ensure no growth      RTC: 6M       Carly Ac MD   of Medicine  Department of Medicine  [facility]  [facility]                               [1]   Past Medical History:  Diagnosis Date    Acute conjunctivitis 02/28/2024    Allergic rhinitis 02/28/2024    Anxiety     Arthralgia of hip 02/28/2024    Attention disturbance 02/28/2024    Chest pain 02/28/2024    Fatigue 02/28/2024    Fever 02/28/2024    H. pylori infection 02/28/2024    Head lice 02/28/2024    Impacted cerumen 01/29/2023    Inflammation of stomach and intestine 02/28/2024    Menorrhagia 02/28/2024    Otalgia 02/28/2024    Otitis externa 02/28/2024    Pediculosis capitis 02/28/2024    Personal history of other specified conditions 11/29/2021    History of chest pain    Pharyngitis 02/28/2024    Sore throat 02/28/2024    Suspected COVID-19 virus infection 02/28/2024    Suspected severe acute respiratory syndrome coronavirus 2 (SARS-CoV-2) infection 02/28/2024    URI (upper respiratory infection) 02/28/2024   [2]   Past Surgical History:  Procedure Laterality Date    CHOLECYSTECTOMY      WISDOM TOOTH EXTRACTION     [3]   Family History  Problem Relation Name Age of Onset    Kidney disease Mother Azeza     Abnormal EKG Mother Azeza     Diabetes Father Enrique     Heart disease Father Enrique     Asthma Sister Elvisan     Early natural death Brother Gilson     Heart disease Brother Gilson      Vision loss Father's Sister Aunt Hamdia     Arthritis Son Sanju     Asthma Son Yezen    [4] No Known Allergies

## 2025-08-07 LAB
T3FREE SERPL-MCNC: 3.5 PG/ML (ref 2.3–4.2)
T4 FREE SERPL-MCNC: 1.3 NG/DL (ref 0.8–1.8)
TSH SERPL-ACNC: <0.01 MIU/L

## 2025-08-08 ENCOUNTER — APPOINTMENT (OUTPATIENT)
Age: 40
End: 2025-08-08
Payer: COMMERCIAL

## 2025-08-08 VITALS
HEIGHT: 64 IN | HEART RATE: 86 BPM | BODY MASS INDEX: 31.43 KG/M2 | DIASTOLIC BLOOD PRESSURE: 96 MMHG | WEIGHT: 184.08 LBS | SYSTOLIC BLOOD PRESSURE: 148 MMHG

## 2025-08-08 DIAGNOSIS — E05.90 HYPERTHYROIDISM: Primary | ICD-10-CM

## 2025-08-08 DIAGNOSIS — E55.9 VITAMIN D DEFICIENCY: ICD-10-CM

## 2025-08-08 DIAGNOSIS — E04.1 THYROID NODULE: ICD-10-CM

## 2025-08-08 PROCEDURE — 99214 OFFICE O/P EST MOD 30 MIN: CPT | Performed by: STUDENT IN AN ORGANIZED HEALTH CARE EDUCATION/TRAINING PROGRAM

## 2025-08-08 PROCEDURE — 3008F BODY MASS INDEX DOCD: CPT | Performed by: STUDENT IN AN ORGANIZED HEALTH CARE EDUCATION/TRAINING PROGRAM

## 2025-08-08 RX ORDER — METHIMAZOLE 5 MG/1
10 TABLET ORAL 2 TIMES DAILY
Qty: 120 TABLET | Refills: 5 | Status: SHIPPED | OUTPATIENT
Start: 2025-08-08

## 2025-08-08 ASSESSMENT — ENCOUNTER SYMPTOMS
LOSS OF SENSATION IN FEET: 0
DEPRESSION: 0
OCCASIONAL FEELINGS OF UNSTEADINESS: 0

## 2025-08-08 NOTE — PATIENT INSTRUCTIONS
After Visit Summary  It was great seeing you today!    In summary from our visit today, I would like to remind you of the following:    - stay on atenolol 50 mg daily for now  - decrease you PM dose of methimazole to 5 mg daily  - continue AM dose of methimazole 10 mg daily  - repeat labs in 6-8 weeks      Division of Endocrinology   Follow-up appointments:  Please arrive at least 20 minutes prior to your follow up appointments in order to keep visits as close as possible to the scheduled times. If you need to cancel an appointment, please call at least 24 hours in advance.    Please bring your medications or an updated list of medications to each of your visits to help ensure safety in prescribing future medications.    If you are being seen for diabetes, please bring your glucose meter and/or glucose log with 2 weeks of glucose readings to each visit.    Medication Refills: Please request medication refills at the time of your appointment.   - Refills requested by phone or [email or web address] in between visits require at least 3 business days to be processed.    Lab Results: Please allow at least 7 business days for lab results to be reviewed.  - Please note, that some specialty testing may take up to at least 7 business to be completed.   - If there are any urgent issues requiring immediate attention, we will contact you at your provided phone numbers.   - Any non-urgent results will be relayed via [employer] if you have signed up for this service or via a letter through the mail and/or phone call.     Communication with your provider:  - [facility] [employer] is highly recommended as the most efficient means to contact your provider. However for urgent concerns please call.   - For phone calls, please call our office Monday- Friday 8am to 4:30pm and your message will be relayed to your provider. Please allows 1-2 business days for a response.  - After hours messages are left with an answering service and will be handled  by the clinic the following business day.      Sincerely,   Carly Ac MD  Division of Endocrinology  [facility]

## 2025-08-09 LAB
25(OH)D3+25(OH)D2 SERPL-MCNC: 7 NG/ML (ref 30–100)
T3 SERPL-MCNC: 112 NG/DL (ref 76–181)
T4 FREE SERPL-MCNC: 1.3 NG/DL (ref 0.8–1.8)
TSH SERPL-ACNC: <0.01 MIU/L
TSI SER-ACNC: NORMAL

## 2025-08-10 DIAGNOSIS — E55.9 VITAMIN D DEFICIENCY: Primary | ICD-10-CM

## 2025-08-10 DIAGNOSIS — E05.90 HYPERTHYROIDISM: ICD-10-CM

## 2025-08-10 RX ORDER — ERGOCALCIFEROL 1.25 MG/1
1.25 CAPSULE ORAL WEEKLY
Qty: 12 CAPSULE | Refills: 0 | Status: SHIPPED | OUTPATIENT
Start: 2025-08-10 | End: 2025-11-08

## 2025-08-13 LAB
25(OH)D3+25(OH)D2 SERPL-MCNC: 7 NG/ML (ref 30–100)
T3 SERPL-MCNC: 112 NG/DL (ref 76–181)
T4 FREE SERPL-MCNC: 1.3 NG/DL (ref 0.8–1.8)
TSH SERPL-ACNC: <0.01 MIU/L
TSI SER-ACNC: 652 % BASELINE

## 2025-09-04 ENCOUNTER — APPOINTMENT (OUTPATIENT)
Dept: ENDOCRINOLOGY | Facility: CLINIC | Age: 40
End: 2025-09-04
Payer: COMMERCIAL

## 2025-09-05 ENCOUNTER — PATIENT MESSAGE (OUTPATIENT)
Dept: ENDOCRINOLOGY | Facility: CLINIC | Age: 40
End: 2025-09-05
Payer: COMMERCIAL

## 2025-09-05 DIAGNOSIS — E66.811 CLASS 1 OBESITY WITHOUT SERIOUS COMORBIDITY WITH BODY MASS INDEX (BMI) OF 32.0 TO 32.9 IN ADULT, UNSPECIFIED OBESITY TYPE: ICD-10-CM

## 2025-09-08 ENCOUNTER — APPOINTMENT (OUTPATIENT)
Dept: RADIOLOGY | Facility: CLINIC | Age: 40
End: 2025-09-08
Payer: COMMERCIAL

## 2025-09-12 ENCOUNTER — APPOINTMENT (OUTPATIENT)
Dept: RADIOLOGY | Facility: CLINIC | Age: 40
End: 2025-09-12
Payer: COMMERCIAL

## 2025-10-09 ENCOUNTER — APPOINTMENT (OUTPATIENT)
Dept: ENDOCRINOLOGY | Facility: CLINIC | Age: 40
End: 2025-10-09
Payer: COMMERCIAL

## 2026-02-12 ENCOUNTER — APPOINTMENT (OUTPATIENT)
Age: 41
End: 2026-02-12
Payer: COMMERCIAL